# Patient Record
Sex: FEMALE | Race: WHITE | Employment: OTHER | URBAN - METROPOLITAN AREA
[De-identification: names, ages, dates, MRNs, and addresses within clinical notes are randomized per-mention and may not be internally consistent; named-entity substitution may affect disease eponyms.]

---

## 2017-02-11 RX ORDER — AMLODIPINE BESYLATE AND BENAZEPRIL HYDROCHLORIDE 5; 10 MG/1; MG/1
CAPSULE ORAL
Qty: 90 CAPSULE | Refills: 1 | Status: SHIPPED | OUTPATIENT
Start: 2017-02-11 | End: 2017-10-05 | Stop reason: SDUPTHER

## 2017-02-11 RX ORDER — OMEPRAZOLE 20 MG/1
CAPSULE, DELAYED RELEASE ORAL
Qty: 90 CAPSULE | Refills: 1 | Status: SHIPPED | OUTPATIENT
Start: 2017-02-11 | End: 2017-10-05 | Stop reason: SDUPTHER

## 2017-02-11 RX ORDER — CELECOXIB 200 MG/1
CAPSULE ORAL
Qty: 90 CAPSULE | Refills: 1 | Status: SHIPPED | OUTPATIENT
Start: 2017-02-11 | End: 2017-10-05 | Stop reason: SDUPTHER

## 2017-02-27 DIAGNOSIS — R30.0 DYSURIA: ICD-10-CM

## 2017-02-27 RX ORDER — ATORVASTATIN CALCIUM 10 MG/1
TABLET, FILM COATED ORAL
Qty: 90 TABLET | Refills: 0 | Status: SHIPPED | OUTPATIENT
Start: 2017-02-27 | End: 2017-04-27 | Stop reason: SDUPTHER

## 2017-03-01 LAB — URINE CULTURE, ROUTINE: NORMAL

## 2017-04-25 ENCOUNTER — TELEPHONE (OUTPATIENT)
Dept: FAMILY MEDICINE CLINIC | Age: 72
End: 2017-04-25

## 2017-04-27 ENCOUNTER — OFFICE VISIT (OUTPATIENT)
Dept: FAMILY MEDICINE CLINIC | Age: 72
End: 2017-04-27

## 2017-04-27 VITALS
DIASTOLIC BLOOD PRESSURE: 68 MMHG | BODY MASS INDEX: 25.52 KG/M2 | TEMPERATURE: 97.9 F | RESPIRATION RATE: 20 BRPM | HEART RATE: 68 BPM | SYSTOLIC BLOOD PRESSURE: 108 MMHG | HEIGHT: 63 IN | WEIGHT: 144 LBS

## 2017-04-27 DIAGNOSIS — E78.5 HYPERLIPIDEMIA, UNSPECIFIED HYPERLIPIDEMIA TYPE: ICD-10-CM

## 2017-04-27 DIAGNOSIS — I10 ESSENTIAL HYPERTENSION: ICD-10-CM

## 2017-04-27 DIAGNOSIS — E55.9 VITAMIN D DEFICIENCY: ICD-10-CM

## 2017-04-27 DIAGNOSIS — I10 ESSENTIAL HYPERTENSION: Primary | ICD-10-CM

## 2017-04-27 LAB
ALBUMIN SERPL-MCNC: 4.3 G/DL (ref 3.9–4.9)
ALP BLD-CCNC: 66 U/L (ref 40–130)
ALT SERPL-CCNC: 14 U/L (ref 0–33)
ANION GAP SERPL CALCULATED.3IONS-SCNC: 11 MEQ/L (ref 7–13)
AST SERPL-CCNC: 23 U/L (ref 0–35)
BASOPHILS ABSOLUTE: 0.1 K/UL (ref 0–0.2)
BASOPHILS RELATIVE PERCENT: 1.3 %
BILIRUB SERPL-MCNC: 1.3 MG/DL (ref 0–1.2)
BUN BLDV-MCNC: 17 MG/DL (ref 8–23)
CALCIUM SERPL-MCNC: 9.5 MG/DL (ref 8.6–10.2)
CHLORIDE BLD-SCNC: 100 MEQ/L (ref 98–107)
CHOLESTEROL, TOTAL: 164 MG/DL (ref 0–199)
CO2: 30 MEQ/L (ref 22–29)
CREAT SERPL-MCNC: 0.74 MG/DL (ref 0.5–0.9)
EOSINOPHILS ABSOLUTE: 0.2 K/UL (ref 0–0.7)
EOSINOPHILS RELATIVE PERCENT: 4 %
GFR AFRICAN AMERICAN: >60
GFR NON-AFRICAN AMERICAN: >60
GLOBULIN: 1.9 G/DL (ref 2.3–3.5)
GLUCOSE BLD-MCNC: 90 MG/DL (ref 74–109)
HCT VFR BLD CALC: 36.5 % (ref 37–47)
HDLC SERPL-MCNC: 85 MG/DL (ref 40–59)
HEMOGLOBIN: 12.3 G/DL (ref 12–16)
LDL CHOLESTEROL CALCULATED: 64 MG/DL (ref 0–129)
LYMPHOCYTES ABSOLUTE: 1.5 K/UL (ref 1–4.8)
LYMPHOCYTES RELATIVE PERCENT: 34.2 %
MCH RBC QN AUTO: 29.3 PG (ref 27–31.3)
MCHC RBC AUTO-ENTMCNC: 33.7 % (ref 33–37)
MCV RBC AUTO: 86.9 FL (ref 82–100)
MONOCYTES ABSOLUTE: 0.4 K/UL (ref 0.2–0.8)
MONOCYTES RELATIVE PERCENT: 10.2 %
NEUTROPHILS ABSOLUTE: 2.2 K/UL (ref 1.4–6.5)
NEUTROPHILS RELATIVE PERCENT: 50.3 %
PDW BLD-RTO: 13.5 % (ref 11.5–14.5)
PLATELET # BLD: 168 K/UL (ref 130–400)
POTASSIUM SERPL-SCNC: 3.3 MEQ/L (ref 3.5–5.1)
RBC # BLD: 4.19 M/UL (ref 4.2–5.4)
SODIUM BLD-SCNC: 141 MEQ/L (ref 132–144)
TOTAL PROTEIN: 6.2 G/DL (ref 6.4–8.1)
TRIGL SERPL-MCNC: 77 MG/DL (ref 0–200)
VITAMIN D 25-HYDROXY: 57.3 NG/ML (ref 30–100)
WBC # BLD: 4.3 K/UL (ref 4.8–10.8)

## 2017-04-27 PROCEDURE — 1090F PRES/ABSN URINE INCON ASSESS: CPT | Performed by: FAMILY MEDICINE

## 2017-04-27 PROCEDURE — G8427 DOCREV CUR MEDS BY ELIG CLIN: HCPCS | Performed by: FAMILY MEDICINE

## 2017-04-27 PROCEDURE — G8420 CALC BMI NORM PARAMETERS: HCPCS | Performed by: FAMILY MEDICINE

## 2017-04-27 PROCEDURE — G8399 PT W/DXA RESULTS DOCUMENT: HCPCS | Performed by: FAMILY MEDICINE

## 2017-04-27 PROCEDURE — 1036F TOBACCO NON-USER: CPT | Performed by: FAMILY MEDICINE

## 2017-04-27 PROCEDURE — 99213 OFFICE O/P EST LOW 20 MIN: CPT | Performed by: FAMILY MEDICINE

## 2017-04-27 PROCEDURE — 4040F PNEUMOC VAC/ADMIN/RCVD: CPT | Performed by: FAMILY MEDICINE

## 2017-04-27 PROCEDURE — 1123F ACP DISCUSS/DSCN MKR DOCD: CPT | Performed by: FAMILY MEDICINE

## 2017-04-27 PROCEDURE — 3014F SCREEN MAMMO DOC REV: CPT | Performed by: FAMILY MEDICINE

## 2017-04-27 PROCEDURE — 3017F COLORECTAL CA SCREEN DOC REV: CPT | Performed by: FAMILY MEDICINE

## 2017-04-27 RX ORDER — ATORVASTATIN CALCIUM 10 MG/1
TABLET, FILM COATED ORAL
Qty: 90 TABLET | Refills: 1 | Status: SHIPPED | OUTPATIENT
Start: 2017-04-27 | End: 2018-03-03 | Stop reason: SDUPTHER

## 2017-04-27 RX ORDER — GABAPENTIN 300 MG/1
CAPSULE ORAL
Qty: 90 CAPSULE | Refills: 1 | Status: SHIPPED | OUTPATIENT
Start: 2017-04-27 | End: 2017-11-20 | Stop reason: SDUPTHER

## 2017-04-27 RX ORDER — INDAPAMIDE 1.25 MG/1
TABLET, FILM COATED ORAL
Qty: 90 TABLET | Refills: 1 | Status: SHIPPED | OUTPATIENT
Start: 2017-04-27 | End: 2017-05-25

## 2017-04-27 ASSESSMENT — ENCOUNTER SYMPTOMS
EYE ITCHING: 0
ABDOMINAL PAIN: 0
CONSTIPATION: 0
DIARRHEA: 0
SINUS PRESSURE: 0
COUGH: 0
SHORTNESS OF BREATH: 0
EYE DISCHARGE: 0
SORE THROAT: 0

## 2017-04-27 ASSESSMENT — PATIENT HEALTH QUESTIONNAIRE - PHQ9
SUM OF ALL RESPONSES TO PHQ9 QUESTIONS 1 & 2: 0
SUM OF ALL RESPONSES TO PHQ QUESTIONS 1-9: 0
2. FEELING DOWN, DEPRESSED OR HOPELESS: 0
1. LITTLE INTEREST OR PLEASURE IN DOING THINGS: 0

## 2017-05-25 ENCOUNTER — OFFICE VISIT (OUTPATIENT)
Dept: FAMILY MEDICINE CLINIC | Age: 72
End: 2017-05-25

## 2017-05-25 VITALS
DIASTOLIC BLOOD PRESSURE: 68 MMHG | TEMPERATURE: 97.5 F | RESPIRATION RATE: 20 BRPM | HEART RATE: 55 BPM | BODY MASS INDEX: 27.38 KG/M2 | WEIGHT: 145 LBS | HEIGHT: 61 IN | SYSTOLIC BLOOD PRESSURE: 114 MMHG

## 2017-05-25 DIAGNOSIS — N28.89 RENAL MASS, LEFT: ICD-10-CM

## 2017-05-25 DIAGNOSIS — R42 DIZZINESS: Primary | ICD-10-CM

## 2017-05-25 DIAGNOSIS — Z09 HOSPITAL DISCHARGE FOLLOW-UP: ICD-10-CM

## 2017-05-25 DIAGNOSIS — R55 NEAR SYNCOPE: ICD-10-CM

## 2017-05-25 PROCEDURE — 1123F ACP DISCUSS/DSCN MKR DOCD: CPT | Performed by: FAMILY MEDICINE

## 2017-05-25 PROCEDURE — 3014F SCREEN MAMMO DOC REV: CPT | Performed by: FAMILY MEDICINE

## 2017-05-25 PROCEDURE — 1090F PRES/ABSN URINE INCON ASSESS: CPT | Performed by: FAMILY MEDICINE

## 2017-05-25 PROCEDURE — 3017F COLORECTAL CA SCREEN DOC REV: CPT | Performed by: FAMILY MEDICINE

## 2017-05-25 PROCEDURE — 1036F TOBACCO NON-USER: CPT | Performed by: FAMILY MEDICINE

## 2017-05-25 PROCEDURE — 99213 OFFICE O/P EST LOW 20 MIN: CPT | Performed by: FAMILY MEDICINE

## 2017-05-25 PROCEDURE — G8420 CALC BMI NORM PARAMETERS: HCPCS | Performed by: FAMILY MEDICINE

## 2017-05-25 PROCEDURE — G8427 DOCREV CUR MEDS BY ELIG CLIN: HCPCS | Performed by: FAMILY MEDICINE

## 2017-05-25 PROCEDURE — 4040F PNEUMOC VAC/ADMIN/RCVD: CPT | Performed by: FAMILY MEDICINE

## 2017-05-25 PROCEDURE — G8399 PT W/DXA RESULTS DOCUMENT: HCPCS | Performed by: FAMILY MEDICINE

## 2017-05-25 ASSESSMENT — ENCOUNTER SYMPTOMS
EYE DISCHARGE: 0
COUGH: 0
ABDOMINAL PAIN: 0
DIARRHEA: 0
SORE THROAT: 0
SHORTNESS OF BREATH: 0
EYE ITCHING: 0
CONSTIPATION: 0
SINUS PRESSURE: 0

## 2017-06-05 ENCOUNTER — HOSPITAL ENCOUNTER (OUTPATIENT)
Dept: MRI IMAGING | Age: 72
Discharge: HOME | End: 2017-06-05
Payer: MEDICARE

## 2017-06-05 DIAGNOSIS — N28.89 RENAL MASS, LEFT: ICD-10-CM

## 2017-06-05 PROCEDURE — 6360000004 HC RX CONTRAST MEDICATION: Performed by: RADIOLOGY

## 2017-06-05 PROCEDURE — A9579 GAD-BASE MR CONTRAST NOS,1ML: HCPCS | Performed by: RADIOLOGY

## 2017-06-05 PROCEDURE — 74183 MRI ABD W/O CNTR FLWD CNTR: CPT

## 2017-06-05 RX ADMIN — GADOVERSETAMIDE 30 ML: 0.5 INJECTION, SOLUTION INTRAVENOUS at 09:49

## 2017-06-06 ENCOUNTER — OFFICE VISIT (OUTPATIENT)
Dept: FAMILY MEDICINE CLINIC | Age: 72
End: 2017-06-06

## 2017-06-06 VITALS
HEART RATE: 60 BPM | WEIGHT: 150 LBS | TEMPERATURE: 98.6 F | BODY MASS INDEX: 28.32 KG/M2 | SYSTOLIC BLOOD PRESSURE: 130 MMHG | DIASTOLIC BLOOD PRESSURE: 76 MMHG | HEIGHT: 61 IN | RESPIRATION RATE: 12 BRPM

## 2017-06-06 DIAGNOSIS — N28.89 RENAL MASS, LEFT: ICD-10-CM

## 2017-06-06 DIAGNOSIS — R10.9 ABDOMINAL PAIN, UNSPECIFIED LOCATION: Primary | ICD-10-CM

## 2017-06-06 PROCEDURE — 99214 OFFICE O/P EST MOD 30 MIN: CPT | Performed by: FAMILY MEDICINE

## 2017-06-06 PROCEDURE — G8427 DOCREV CUR MEDS BY ELIG CLIN: HCPCS | Performed by: FAMILY MEDICINE

## 2017-06-06 PROCEDURE — 1090F PRES/ABSN URINE INCON ASSESS: CPT | Performed by: FAMILY MEDICINE

## 2017-06-06 PROCEDURE — 3014F SCREEN MAMMO DOC REV: CPT | Performed by: FAMILY MEDICINE

## 2017-06-06 PROCEDURE — 4040F PNEUMOC VAC/ADMIN/RCVD: CPT | Performed by: FAMILY MEDICINE

## 2017-06-06 PROCEDURE — 1036F TOBACCO NON-USER: CPT | Performed by: FAMILY MEDICINE

## 2017-06-06 PROCEDURE — 1123F ACP DISCUSS/DSCN MKR DOCD: CPT | Performed by: FAMILY MEDICINE

## 2017-06-06 PROCEDURE — G8399 PT W/DXA RESULTS DOCUMENT: HCPCS | Performed by: FAMILY MEDICINE

## 2017-06-06 PROCEDURE — G8420 CALC BMI NORM PARAMETERS: HCPCS | Performed by: FAMILY MEDICINE

## 2017-06-06 PROCEDURE — 3017F COLORECTAL CA SCREEN DOC REV: CPT | Performed by: FAMILY MEDICINE

## 2017-06-06 ASSESSMENT — ENCOUNTER SYMPTOMS
CONSTIPATION: 0
SHORTNESS OF BREATH: 0
NAUSEA: 0
COUGH: 0
EYES NEGATIVE: 1
ABDOMINAL PAIN: 0
DIARRHEA: 0

## 2017-06-12 ENCOUNTER — OFFICE VISIT (OUTPATIENT)
Dept: UROLOGY | Age: 72
End: 2017-06-12

## 2017-06-12 VITALS
HEART RATE: 56 BPM | SYSTOLIC BLOOD PRESSURE: 140 MMHG | BODY MASS INDEX: 27.38 KG/M2 | DIASTOLIC BLOOD PRESSURE: 86 MMHG | WEIGHT: 145 LBS | HEIGHT: 61 IN

## 2017-06-12 DIAGNOSIS — N28.89 LEFT RENAL MASS: Primary | ICD-10-CM

## 2017-06-12 PROCEDURE — 4040F PNEUMOC VAC/ADMIN/RCVD: CPT | Performed by: UROLOGY

## 2017-06-12 PROCEDURE — 99202 OFFICE O/P NEW SF 15 MIN: CPT | Performed by: UROLOGY

## 2017-06-12 PROCEDURE — G8419 CALC BMI OUT NRM PARAM NOF/U: HCPCS | Performed by: UROLOGY

## 2017-06-12 PROCEDURE — 1036F TOBACCO NON-USER: CPT | Performed by: UROLOGY

## 2017-06-12 PROCEDURE — G8399 PT W/DXA RESULTS DOCUMENT: HCPCS | Performed by: UROLOGY

## 2017-06-12 PROCEDURE — G8427 DOCREV CUR MEDS BY ELIG CLIN: HCPCS | Performed by: UROLOGY

## 2017-06-12 PROCEDURE — 1090F PRES/ABSN URINE INCON ASSESS: CPT | Performed by: UROLOGY

## 2017-06-12 PROCEDURE — 3017F COLORECTAL CA SCREEN DOC REV: CPT | Performed by: UROLOGY

## 2017-06-12 PROCEDURE — 3014F SCREEN MAMMO DOC REV: CPT | Performed by: UROLOGY

## 2017-06-12 PROCEDURE — 1123F ACP DISCUSS/DSCN MKR DOCD: CPT | Performed by: UROLOGY

## 2017-06-13 ENCOUNTER — OFFICE VISIT (OUTPATIENT)
Dept: INTERVENTIONAL RADIOLOGY/VASCULAR | Age: 72
End: 2017-06-13

## 2017-06-13 VITALS
HEART RATE: 59 BPM | OXYGEN SATURATION: 94 % | RESPIRATION RATE: 16 BRPM | SYSTOLIC BLOOD PRESSURE: 132 MMHG | DIASTOLIC BLOOD PRESSURE: 66 MMHG

## 2017-06-13 DIAGNOSIS — N28.89 LEFT RENAL MASS: Primary | ICD-10-CM

## 2017-06-13 PROCEDURE — 3014F SCREEN MAMMO DOC REV: CPT | Performed by: RADIOLOGY

## 2017-06-13 PROCEDURE — 99204 OFFICE O/P NEW MOD 45 MIN: CPT | Performed by: RADIOLOGY

## 2017-06-13 PROCEDURE — G8419 CALC BMI OUT NRM PARAM NOF/U: HCPCS | Performed by: RADIOLOGY

## 2017-06-13 PROCEDURE — 1123F ACP DISCUSS/DSCN MKR DOCD: CPT | Performed by: RADIOLOGY

## 2017-06-13 PROCEDURE — G8427 DOCREV CUR MEDS BY ELIG CLIN: HCPCS | Performed by: RADIOLOGY

## 2017-06-13 PROCEDURE — 3017F COLORECTAL CA SCREEN DOC REV: CPT | Performed by: RADIOLOGY

## 2017-06-13 PROCEDURE — G8399 PT W/DXA RESULTS DOCUMENT: HCPCS | Performed by: RADIOLOGY

## 2017-06-13 PROCEDURE — 1090F PRES/ABSN URINE INCON ASSESS: CPT | Performed by: RADIOLOGY

## 2017-06-13 PROCEDURE — 1036F TOBACCO NON-USER: CPT | Performed by: RADIOLOGY

## 2017-06-13 PROCEDURE — 4040F PNEUMOC VAC/ADMIN/RCVD: CPT | Performed by: RADIOLOGY

## 2017-06-13 ASSESSMENT — ENCOUNTER SYMPTOMS
DIARRHEA: 0
VOMITING: 0
HEARTBURN: 0
NAUSEA: 0
BLURRED VISION: 0
WHEEZING: 0
SPUTUM PRODUCTION: 0
COUGH: 0
SHORTNESS OF BREATH: 0
DOUBLE VISION: 0

## 2017-08-24 ENCOUNTER — OFFICE VISIT (OUTPATIENT)
Dept: FAMILY MEDICINE CLINIC | Age: 72
End: 2017-08-24

## 2017-08-24 VITALS
HEIGHT: 61 IN | RESPIRATION RATE: 16 BRPM | BODY MASS INDEX: 28.32 KG/M2 | DIASTOLIC BLOOD PRESSURE: 68 MMHG | WEIGHT: 150 LBS | SYSTOLIC BLOOD PRESSURE: 108 MMHG | TEMPERATURE: 98.2 F | HEART RATE: 60 BPM

## 2017-08-24 DIAGNOSIS — E04.1 THYROID NODULE: ICD-10-CM

## 2017-08-24 DIAGNOSIS — Z90.5 S/P NEPHRECTOMY: Primary | ICD-10-CM

## 2017-08-24 DIAGNOSIS — E04.9 ENLARGED THYROID GLAND: ICD-10-CM

## 2017-08-24 DIAGNOSIS — K80.20 GALLSTONES: ICD-10-CM

## 2017-08-24 PROCEDURE — 1036F TOBACCO NON-USER: CPT | Performed by: FAMILY MEDICINE

## 2017-08-24 PROCEDURE — 1123F ACP DISCUSS/DSCN MKR DOCD: CPT | Performed by: FAMILY MEDICINE

## 2017-08-24 PROCEDURE — 4040F PNEUMOC VAC/ADMIN/RCVD: CPT | Performed by: FAMILY MEDICINE

## 2017-08-24 PROCEDURE — 1090F PRES/ABSN URINE INCON ASSESS: CPT | Performed by: FAMILY MEDICINE

## 2017-08-24 PROCEDURE — G8427 DOCREV CUR MEDS BY ELIG CLIN: HCPCS | Performed by: FAMILY MEDICINE

## 2017-08-24 PROCEDURE — G8419 CALC BMI OUT NRM PARAM NOF/U: HCPCS | Performed by: FAMILY MEDICINE

## 2017-08-24 PROCEDURE — 3014F SCREEN MAMMO DOC REV: CPT | Performed by: FAMILY MEDICINE

## 2017-08-24 PROCEDURE — G8399 PT W/DXA RESULTS DOCUMENT: HCPCS | Performed by: FAMILY MEDICINE

## 2017-08-24 PROCEDURE — 3017F COLORECTAL CA SCREEN DOC REV: CPT | Performed by: FAMILY MEDICINE

## 2017-08-24 PROCEDURE — 99214 OFFICE O/P EST MOD 30 MIN: CPT | Performed by: FAMILY MEDICINE

## 2017-08-24 ASSESSMENT — ENCOUNTER SYMPTOMS
DIARRHEA: 0
COUGH: 0
EYE DISCHARGE: 0
ABDOMINAL PAIN: 0
SORE THROAT: 0
SHORTNESS OF BREATH: 0
EYE ITCHING: 0
SINUS PRESSURE: 0
CONSTIPATION: 0

## 2017-08-26 RX ORDER — IRON POLYSACCHARIDE COMPLEX 150 MG
CAPSULE ORAL
Qty: 180 CAPSULE | Refills: 0 | Status: SHIPPED | OUTPATIENT
Start: 2017-08-26 | End: 2017-12-22 | Stop reason: SDUPTHER

## 2017-08-28 ENCOUNTER — HOSPITAL ENCOUNTER (OUTPATIENT)
Dept: ULTRASOUND IMAGING | Age: 72
Discharge: HOME | End: 2017-08-28
Payer: MEDICARE

## 2017-08-28 DIAGNOSIS — E04.1 THYROID NODULE: ICD-10-CM

## 2017-08-28 PROCEDURE — 76536 US EXAM OF HEAD AND NECK: CPT

## 2017-09-11 ENCOUNTER — OFFICE VISIT (OUTPATIENT)
Dept: UROLOGY | Age: 72
End: 2017-09-11

## 2017-09-11 VITALS
BODY MASS INDEX: 29.64 KG/M2 | HEIGHT: 59 IN | DIASTOLIC BLOOD PRESSURE: 86 MMHG | HEART RATE: 57 BPM | WEIGHT: 147 LBS | SYSTOLIC BLOOD PRESSURE: 138 MMHG

## 2017-09-11 DIAGNOSIS — D64.9 ANEMIA, UNSPECIFIED TYPE: ICD-10-CM

## 2017-09-11 DIAGNOSIS — N28.9 RENAL INSUFFICIENCY: Primary | ICD-10-CM

## 2017-09-11 DIAGNOSIS — N28.9 RENAL INSUFFICIENCY: ICD-10-CM

## 2017-09-11 LAB
ANION GAP SERPL CALCULATED.3IONS-SCNC: 15 MEQ/L (ref 7–13)
BUN BLDV-MCNC: 28 MG/DL (ref 8–23)
CALCIUM SERPL-MCNC: 9.7 MG/DL (ref 8.6–10.2)
CHLORIDE BLD-SCNC: 98 MEQ/L (ref 98–107)
CO2: 27 MEQ/L (ref 22–29)
CREAT SERPL-MCNC: 1.15 MG/DL (ref 0.5–0.9)
GFR AFRICAN AMERICAN: 56.4
GFR NON-AFRICAN AMERICAN: 46.6
GLUCOSE BLD-MCNC: 89 MG/DL (ref 74–109)
HCT VFR BLD CALC: 35.7 % (ref 37–47)
HEMOGLOBIN: 11.6 G/DL (ref 12–16)
MCH RBC QN AUTO: 28.1 PG (ref 27–31.3)
MCHC RBC AUTO-ENTMCNC: 32.6 % (ref 33–37)
MCV RBC AUTO: 86.1 FL (ref 82–100)
PDW BLD-RTO: 13.4 % (ref 11.5–14.5)
PLATELET # BLD: 219 K/UL (ref 130–400)
POTASSIUM SERPL-SCNC: 4.3 MEQ/L (ref 3.5–5.1)
RBC # BLD: 4.14 M/UL (ref 4.2–5.4)
SODIUM BLD-SCNC: 140 MEQ/L (ref 132–144)
WBC # BLD: 5.2 K/UL (ref 4.8–10.8)

## 2017-09-11 PROCEDURE — 4040F PNEUMOC VAC/ADMIN/RCVD: CPT | Performed by: UROLOGY

## 2017-09-11 PROCEDURE — 1123F ACP DISCUSS/DSCN MKR DOCD: CPT | Performed by: UROLOGY

## 2017-09-11 PROCEDURE — 1036F TOBACCO NON-USER: CPT | Performed by: UROLOGY

## 2017-09-11 PROCEDURE — 1090F PRES/ABSN URINE INCON ASSESS: CPT | Performed by: UROLOGY

## 2017-09-11 PROCEDURE — 3014F SCREEN MAMMO DOC REV: CPT | Performed by: UROLOGY

## 2017-09-11 PROCEDURE — G8417 CALC BMI ABV UP PARAM F/U: HCPCS | Performed by: UROLOGY

## 2017-09-11 PROCEDURE — 99214 OFFICE O/P EST MOD 30 MIN: CPT | Performed by: UROLOGY

## 2017-09-11 PROCEDURE — G8399 PT W/DXA RESULTS DOCUMENT: HCPCS | Performed by: UROLOGY

## 2017-09-11 PROCEDURE — G8427 DOCREV CUR MEDS BY ELIG CLIN: HCPCS | Performed by: UROLOGY

## 2017-09-11 PROCEDURE — 3017F COLORECTAL CA SCREEN DOC REV: CPT | Performed by: UROLOGY

## 2017-09-20 ENCOUNTER — OFFICE VISIT (OUTPATIENT)
Dept: FAMILY MEDICINE CLINIC | Age: 72
End: 2017-09-20

## 2017-09-20 VITALS
SYSTOLIC BLOOD PRESSURE: 126 MMHG | OXYGEN SATURATION: 98 % | WEIGHT: 153 LBS | BODY MASS INDEX: 28.89 KG/M2 | DIASTOLIC BLOOD PRESSURE: 74 MMHG | HEART RATE: 66 BPM | HEIGHT: 61 IN | RESPIRATION RATE: 20 BRPM

## 2017-09-20 DIAGNOSIS — Z23 NEED FOR INFLUENZA VACCINATION: Primary | ICD-10-CM

## 2017-09-20 DIAGNOSIS — M79.605 PAIN OF LEFT LOWER EXTREMITY: ICD-10-CM

## 2017-09-20 PROCEDURE — 99213 OFFICE O/P EST LOW 20 MIN: CPT | Performed by: FAMILY MEDICINE

## 2017-09-20 PROCEDURE — G8427 DOCREV CUR MEDS BY ELIG CLIN: HCPCS | Performed by: FAMILY MEDICINE

## 2017-09-20 PROCEDURE — G8417 CALC BMI ABV UP PARAM F/U: HCPCS | Performed by: FAMILY MEDICINE

## 2017-09-20 PROCEDURE — 1036F TOBACCO NON-USER: CPT | Performed by: FAMILY MEDICINE

## 2017-09-20 PROCEDURE — 4040F PNEUMOC VAC/ADMIN/RCVD: CPT | Performed by: FAMILY MEDICINE

## 2017-09-20 PROCEDURE — 1123F ACP DISCUSS/DSCN MKR DOCD: CPT | Performed by: FAMILY MEDICINE

## 2017-09-20 PROCEDURE — 1090F PRES/ABSN URINE INCON ASSESS: CPT | Performed by: FAMILY MEDICINE

## 2017-09-20 PROCEDURE — 3017F COLORECTAL CA SCREEN DOC REV: CPT | Performed by: FAMILY MEDICINE

## 2017-09-20 PROCEDURE — 3014F SCREEN MAMMO DOC REV: CPT | Performed by: FAMILY MEDICINE

## 2017-09-20 PROCEDURE — G8399 PT W/DXA RESULTS DOCUMENT: HCPCS | Performed by: FAMILY MEDICINE

## 2017-09-20 PROCEDURE — 90662 IIV NO PRSV INCREASED AG IM: CPT | Performed by: FAMILY MEDICINE

## 2017-09-20 PROCEDURE — G0008 ADMIN INFLUENZA VIRUS VAC: HCPCS | Performed by: FAMILY MEDICINE

## 2017-09-20 RX ORDER — PREDNISONE 10 MG/1
TABLET ORAL
Qty: 51 TABLET | Refills: 0 | Status: SHIPPED | OUTPATIENT
Start: 2017-09-20 | End: 2017-09-30

## 2017-09-20 ASSESSMENT — ENCOUNTER SYMPTOMS
EYE ITCHING: 0
SORE THROAT: 0
ABDOMINAL PAIN: 0
DIARRHEA: 0
EYE DISCHARGE: 0
SHORTNESS OF BREATH: 0
CONSTIPATION: 0
SINUS PRESSURE: 0
COUGH: 0

## 2017-09-22 ENCOUNTER — HOSPITAL ENCOUNTER (OUTPATIENT)
Dept: ULTRASOUND IMAGING | Age: 72
Discharge: HOME | End: 2017-09-22
Payer: MEDICARE

## 2017-09-22 DIAGNOSIS — M79.605 PAIN OF LEFT LOWER EXTREMITY: ICD-10-CM

## 2017-09-22 PROCEDURE — 93971 EXTREMITY STUDY: CPT

## 2017-10-04 ENCOUNTER — OFFICE VISIT (OUTPATIENT)
Dept: FAMILY MEDICINE CLINIC | Age: 72
End: 2017-10-04

## 2017-10-04 VITALS
WEIGHT: 153 LBS | HEIGHT: 61 IN | BODY MASS INDEX: 28.89 KG/M2 | DIASTOLIC BLOOD PRESSURE: 78 MMHG | RESPIRATION RATE: 20 BRPM | HEART RATE: 65 BPM | TEMPERATURE: 97.7 F | SYSTOLIC BLOOD PRESSURE: 118 MMHG

## 2017-10-04 DIAGNOSIS — M79.605 LEFT LEG PAIN: ICD-10-CM

## 2017-10-04 DIAGNOSIS — M54.42 LEFT-SIDED LOW BACK PAIN WITH LEFT-SIDED SCIATICA, UNSPECIFIED CHRONICITY: Primary | ICD-10-CM

## 2017-10-04 DIAGNOSIS — M81.0 AGE-RELATED OSTEOPOROSIS WITHOUT CURRENT PATHOLOGICAL FRACTURE: ICD-10-CM

## 2017-10-04 PROCEDURE — 3017F COLORECTAL CA SCREEN DOC REV: CPT | Performed by: FAMILY MEDICINE

## 2017-10-04 PROCEDURE — G8427 DOCREV CUR MEDS BY ELIG CLIN: HCPCS | Performed by: FAMILY MEDICINE

## 2017-10-04 PROCEDURE — G8417 CALC BMI ABV UP PARAM F/U: HCPCS | Performed by: FAMILY MEDICINE

## 2017-10-04 PROCEDURE — G8484 FLU IMMUNIZE NO ADMIN: HCPCS | Performed by: FAMILY MEDICINE

## 2017-10-04 PROCEDURE — 1036F TOBACCO NON-USER: CPT | Performed by: FAMILY MEDICINE

## 2017-10-04 PROCEDURE — 4040F PNEUMOC VAC/ADMIN/RCVD: CPT | Performed by: FAMILY MEDICINE

## 2017-10-04 PROCEDURE — 3014F SCREEN MAMMO DOC REV: CPT | Performed by: FAMILY MEDICINE

## 2017-10-04 PROCEDURE — G8399 PT W/DXA RESULTS DOCUMENT: HCPCS | Performed by: FAMILY MEDICINE

## 2017-10-04 PROCEDURE — 1090F PRES/ABSN URINE INCON ASSESS: CPT | Performed by: FAMILY MEDICINE

## 2017-10-04 PROCEDURE — 4005F PHARM THX FOR OP RXD: CPT | Performed by: FAMILY MEDICINE

## 2017-10-04 PROCEDURE — 1123F ACP DISCUSS/DSCN MKR DOCD: CPT | Performed by: FAMILY MEDICINE

## 2017-10-04 PROCEDURE — 99213 OFFICE O/P EST LOW 20 MIN: CPT | Performed by: FAMILY MEDICINE

## 2017-10-04 ASSESSMENT — ENCOUNTER SYMPTOMS
CONSTIPATION: 0
SORE THROAT: 0
BACK PAIN: 1
SINUS PRESSURE: 0
EYE ITCHING: 0
DIARRHEA: 0
SHORTNESS OF BREATH: 0
ABDOMINAL PAIN: 0
COUGH: 0
EYE DISCHARGE: 0

## 2017-10-04 NOTE — PROGRESS NOTES
Subjective  Sherron Handler, 81 y.o. female presents today with:  Chief Complaint   Patient presents with    Leg Pain     f/u last ov left leg inner thigh pain that radiated to lower leg. She was rx'ed Prednsione- minimal relief. She now reports that she is having lower back pain, too    Results     venous duplex done 09/22/17           HPI    Patient in for follow-up on test negative for DVT  The back pain. No other questions and or concerns for today's visit      Review of Systems   Constitutional: Negative for appetite change, fatigue and fever. HENT: Negative for congestion, ear pain, sinus pressure and sore throat. Eyes: Negative for discharge and itching. Respiratory: Negative for cough and shortness of breath. Cardiovascular: Negative for chest pain and palpitations. Gastrointestinal: Negative for abdominal pain, constipation and diarrhea. Endocrine: Negative for polydipsia. Genitourinary: Negative for difficulty urinating. Musculoskeletal: Positive for back pain. Negative for gait problem. Skin: Negative. Neurological: Negative for dizziness. Hematological: Negative. Psychiatric/Behavioral: Negative. Past Medical History:   Diagnosis Date    Anemia     Arthrosis of ankle     SEVERE BILATERALLY     Diverticulosis     Gastric polyps     HTN (hypertension) 3/1/2012    Hyperlipidemia 3/1/2012    OA (osteoarthritis)     Osteopenia     Vitamin D deficiency      Past Surgical History:   Procedure Laterality Date    ANKLE SURGERY  2010    LEFT    CARDIAC CATHETERIZATION  2008    NORMAL    CYSTOSCOPY  07/2017    Dr. Tyree Reyes Left 07/2017    left robotic, Dr. Priya Cardenas History     Social History    Marital status:      Spouse name: N/A    Number of children: N/A    Years of education: N/A     Occupational History    Not on file.      Social History Main Topics    Smoking

## 2017-10-05 ENCOUNTER — HOSPITAL ENCOUNTER (OUTPATIENT)
Dept: PHYSICAL THERAPY | Age: 72
Setting detail: THERAPIES SERIES
Discharge: HOME | End: 2017-10-05
Payer: MEDICARE

## 2017-10-05 PROCEDURE — 97110 THERAPEUTIC EXERCISES: CPT

## 2017-10-05 PROCEDURE — G8978 MOBILITY CURRENT STATUS: HCPCS

## 2017-10-05 PROCEDURE — 97162 PT EVAL MOD COMPLEX 30 MIN: CPT

## 2017-10-05 PROCEDURE — G8979 MOBILITY GOAL STATUS: HCPCS

## 2017-10-05 ASSESSMENT — PAIN DESCRIPTION - DESCRIPTORS: DESCRIPTORS: SORE;BURNING

## 2017-10-05 ASSESSMENT — PAIN DESCRIPTION - FREQUENCY: FREQUENCY: INTERMITTENT

## 2017-10-05 ASSESSMENT — PAIN DESCRIPTION - PAIN TYPE: TYPE: ACUTE PAIN

## 2017-10-05 ASSESSMENT — PAIN DESCRIPTION - ORIENTATION: ORIENTATION: LEFT;UPPER;INNER

## 2017-10-05 ASSESSMENT — PAIN DESCRIPTION - LOCATION: LOCATION: LEG

## 2017-10-05 ASSESSMENT — PAIN SCALES - GENERAL: PAINLEVEL_OUTOF10: 5

## 2017-10-05 NOTE — PLAN OF CARE
[address]   [address] [place], [place]  Phone:  [phone]   Fax:  [phone]    [x] Certification  [] Recertification [x]  Plan of Care  [] Progress Note   [] Discharge        To:  Referring Practitioner: Dr. Ilya Sharma   From:  Chacho Waddell, PT  Patient: Jenifer Harper         : 1945  Diagnosis: Lt sided LBP with Lt sided sciatica, Lt leg pain       Date: 10/5/2017  Treatment Diagnosis: difficulty with gait, imbalance, LE weakness, decreased LE and lumbar ROM     Plan of Care/Certification Expiration Date: 17  Progress Report Period from:   to 10/5/2017    Total # of Visits to Date: 1   No Show: 0    Canceled Appointment: 0     OBJECTIVE:   Short Term Goals - Time Frame for Short term goals: 2 wks     Goals Current/Discharge status  Met   Short term goal 1: Independent with HEP   Need for HEP  [] yes  [] no     Long Term Goals - Time Frame for Long term goals : 6 wks   Goals Current/ Discharge status Met   Long term goal 1: Improve LE strength >/= 4+/5 to improve balance and gait quality  Strength RLE  Strength RLE: Exception  R Hip Flexion: 4+/5  R Hip Extension: 3-/5  R Hip ABduction: 4-/5  R Knee Flexion: 4+/5  R Knee Extension: 4/5  R Ankle Dorsiflexion: 4+/5  Strength LLE  Strength LLE: Exception  L Hip Flexion: 4-/5  L Hip Extension: 3+/5  L Hip ABduction: 4-/5  L Knee Flexion: 4+/5  L Knee Extension: 4/5  L Ankle Dorsiflexion: 4+/5 [] yes  [] no   Long term goal 2: Improve balance >/= 52/56 to demonstrate decreased risk for falls  Leigh Balance Score: 47   [] yes  [] no   Long term goal 3: Ambulate with decreased Trendelenburg bilaterally x150' without AD independently  ambulates with bilateral Trendelenburg, good susan, mild pathway deviations, no LOB, good step length [] yes  [] no   Long term goal 4: LEFS >/= 60/80 to improve overall activity tolerance  LEFS: 41/80 [] yes  [] no     Body structures, Functions, Activity

## 2017-10-06 RX ORDER — AMLODIPINE BESYLATE AND BENAZEPRIL HYDROCHLORIDE 5; 10 MG/1; MG/1
CAPSULE ORAL
Qty: 90 CAPSULE | Refills: 1 | Status: SHIPPED | OUTPATIENT
Start: 2017-10-06 | End: 2018-03-21 | Stop reason: SDUPTHER

## 2017-10-06 RX ORDER — CELECOXIB 200 MG/1
CAPSULE ORAL
Qty: 90 CAPSULE | Refills: 1 | Status: SHIPPED | OUTPATIENT
Start: 2017-10-06 | End: 2018-03-21 | Stop reason: SDUPTHER

## 2017-10-06 RX ORDER — OMEPRAZOLE 20 MG/1
CAPSULE, DELAYED RELEASE ORAL
Qty: 90 CAPSULE | Refills: 1 | Status: SHIPPED | OUTPATIENT
Start: 2017-10-06 | End: 2018-03-21 | Stop reason: SDUPTHER

## 2017-10-09 ENCOUNTER — HOSPITAL ENCOUNTER (OUTPATIENT)
Dept: WOMENS IMAGING | Age: 72
Discharge: HOME | End: 2017-10-09
Payer: MEDICARE

## 2017-10-09 DIAGNOSIS — M81.0 AGE-RELATED OSTEOPOROSIS WITHOUT CURRENT PATHOLOGICAL FRACTURE: ICD-10-CM

## 2017-10-09 PROCEDURE — 77080 DXA BONE DENSITY AXIAL: CPT

## 2017-10-10 ENCOUNTER — HOSPITAL ENCOUNTER (OUTPATIENT)
Dept: PHYSICAL THERAPY | Age: 72
Setting detail: THERAPIES SERIES
Discharge: HOME | End: 2017-10-10
Payer: MEDICARE

## 2017-10-10 PROCEDURE — 97110 THERAPEUTIC EXERCISES: CPT

## 2017-10-10 PROCEDURE — 97140 MANUAL THERAPY 1/> REGIONS: CPT

## 2017-10-10 ASSESSMENT — PAIN DESCRIPTION - LOCATION: LOCATION: BACK

## 2017-10-10 ASSESSMENT — PAIN SCALES - GENERAL: PAINLEVEL_OUTOF10: 5

## 2017-10-10 ASSESSMENT — PAIN DESCRIPTION - ORIENTATION: ORIENTATION: LOWER

## 2017-10-10 ASSESSMENT — PAIN DESCRIPTION - PAIN TYPE: TYPE: ACUTE PAIN

## 2017-10-10 NOTE — PROGRESS NOTES
[facility]   Outpatient Physical Therapy   Treatment Note  [] [address]  [x] [facility]            of [address]  Date: 10/10/2017  Patient: Sonam Ritter  : 1945  ACCT #: [de-identified]  Referring Practitioner: Dr. Julian Maguire   Diagnosis: Lt sided LBP with Lt sided sciatica, Lt leg pain     Visit Information:  PT Visit Information  Onset Date:  (5 mos )  PT Insurance Information: Medicare, [facility]   Total # of Visits Approved:  (medical necessity )  Total # of Visits to Date: 2  Plan of Care/Certification Expiration Date: 17  No Show: 0  Canceled Appointment: 0  Progress Note Counter: 2/10    SUBJECTIVE:   Subjective  Subjective: Pt reports she had to help moving her mother's belongings. Reports increased soreness and difficulty finding time to complete exercises. HEP Compliance:  [x] Good [] Fair [] Poor [] Reports not doing due to:    PAIN   Location:   Pain Location: Back  Pain Rating (0-10 pain scale):  Pain Level: 5  Pain Description:    ache  Action:  [x] Acceptable for treatment  []  Other:    OBJECTIVE:   Exercises  Exercise 1: LTR 5s x 10   Exercise 2: prone props x 3 minutes  Exercise 3: hamstring stretch seated 30s x 3  Exercise 4: pelvic tilts 5s x 10  Exercise 5: NuStep L1 6 minutes  Exercise 6: piriformis stretch 30s x 3  Exercise 7: 3 way SLR x 10  Exercise 10: clamshells Ytband x 10    Manual: []  NA   Manual therapy  Soft Tissue Mobalization: STM lumbar, piriformis using tennis ball x 10 minutes    Modalities: [x]  NA    Mobility: [x]  NA    Strength: [x] NT    ROM: [x] NT    HEP  Continue with current Home Exercise Program.  See Objective section for progression of HEP. Comments:       POST-PAIN    Pain Rating (0-10 pain scale): reports low pain  Location and Pain Description same as pre-pain unless otherwise indicated.   Action: [] NA  [] Call Physician  [x] Perform HEP  [x] Meds as prescribed     ASSESSMENT: Conditions Requiring Skilled Therapeutic Intervention  Body structures, Functions, Activity limitations: Decreased functional mobility , Decreased ROM, Decreased strength, Decreased coordination, Decreased endurance, Decreased balance  Assessment: Pt reports decreased pain post PT intervention. Verbal cues and tactile cues provided throughout to improve technique. Tolerance to treatment: [x] Good   [] Fair   [] Poor  [] Fatigued   [] Increased pain   Limited by:    Goals:   Short term goals  Time Frame for Short term goals: 2 wks   Short term goal 1: Independent with HEP   Long term goals  Time Frame for Long term goals : 6 wks   Long term goal 1: Improve LE strength >/= 4+/5 to improve balance and gait quality   Long term goal 2: Improve balance >/= 52/56 to demonstrate decreased risk for falls   Long term goal 3: Ambulate with decreased Trendelenburg bilaterally x150' without AD independently   Long term goal 4: LEFS >/= 60/80 to improve overall activity tolerance     Progress toward goals: ongoing  Goals Met:    []  See updated POC   Comments:    PLAN:  [x] Continue POC to pt tolerance  [] Hold PT for ___ days.   See note to physician  [] Discharge PT    Signature:   Electronically signed by Olman Orr PT on 10/10/17 at 4:15 PM    PT Individual Minutes  Time In: 1104  Time Out: 7698  Minutes: 51       Activity Minutes Units   Ther Ex 41  2   Manual   10   1   Neuro Ed     US

## 2017-10-12 ENCOUNTER — HOSPITAL ENCOUNTER (OUTPATIENT)
Dept: PHYSICAL THERAPY | Age: 72
Setting detail: THERAPIES SERIES
Discharge: HOME | End: 2017-10-12
Payer: MEDICARE

## 2017-10-12 NOTE — PROGRESS NOTES
[facility]    [] [address]  [x] [facility]         of [address]     Physical Therapy  Cancellation/No-show Note  Patient Name:  Sofy Rust  :  1945   Date:  10/12/2017  Referring Practitioner: Dr. Aly Rivas   Diagnosis: Lt sided LBP with Lt sided sciatica, Lt leg pain     Visit Information:  PT Visit Information  Onset Date:  (5 mos )  PT Insurance Information: Medicare, [facility]   Total # of Visits Approved:  (medical necessity )  Total # of Visits to Date: 2  Plan of Care/Certification Expiration Date: 17  No Show: 0  Canceled Appointment: 1  Progress Note Counter: 2/10    For today's appointment patient:  [x]  Cancelled  []  Rescheduled appointment  []  No-show   []  Called pt to remind of next appointment     Reason given by patient:  []  Patient ill  []  Conflicting appointment  []  No transportation    []  Conflict with work  []  No reason given  [x]  Other:   Daughter called to cancel. States \"moving mother has no time to bring her in. Will call next week to schedule future appts. Advised will call them on 10/23 if have not heard from them.      Comments:       Signature: Electronically signed by Graciela Richardson PTA on 10/12/17 at 11:27 AM

## 2017-10-16 DIAGNOSIS — Z78.0 POST-MENOPAUSAL: Primary | ICD-10-CM

## 2017-10-16 NOTE — LETTER
[facility] PCP  [address] [address] [facility] [address]  Phone: [phone]  Fax: [phone]    Curry Chavez MD    October 16, 2017      [address] [address]      Dear Erik Barker,    Screening Bone Densitometry Scan was within normal limits. Continue the monthly Boniva  Repeat test in 12 months. If you have any questions or concerns, please don't hesitate to call.     Sincerely,    Curry Chavez MD

## 2017-10-26 ENCOUNTER — CLINICAL DOCUMENTATION (OUTPATIENT)
Dept: PHYSICAL THERAPY | Age: 72
End: 2017-10-26

## 2017-10-30 ENCOUNTER — HOSPITAL ENCOUNTER (OUTPATIENT)
Dept: PHYSICAL THERAPY | Age: 72
Setting detail: THERAPIES SERIES
Discharge: HOME | End: 2017-10-30
Payer: MEDICARE

## 2017-10-30 PROCEDURE — 97110 THERAPEUTIC EXERCISES: CPT

## 2017-10-30 ASSESSMENT — PAIN SCALES - GENERAL: PAINLEVEL_OUTOF10: 1

## 2017-10-30 ASSESSMENT — PAIN DESCRIPTION - ORIENTATION: ORIENTATION: LOWER

## 2017-10-30 ASSESSMENT — PAIN DESCRIPTION - LOCATION: LOCATION: BACK

## 2017-10-30 NOTE — PROGRESS NOTES
only like to schedule 1x/wk 2* caring for mother now and has restricted availability. Progressed HEP to standing ex with good og. Patient Education: cont with HEP; HP at home as needed     Tolerance to treatment: [x] Good   [] Fair   [] Poor  [] Fatigued   [] Increased pain   Limited by:    Goals:     Short term goals  Time Frame for Short term goals: 2 wks   Short term goal 1: Independent with HEP   Long term goals  Time Frame for Long term goals : 6 wks   Long term goal 1: Improve LE strength >/= 4+/5 to improve balance and gait quality   Long term goal 2: Improve balance >/= 52/56 to demonstrate decreased risk for falls   Long term goal 3: Ambulate with decreased Trendelenburg bilaterally x150' without AD independently   Long term goal 4: LEFS >/= 60/80 to improve overall activity tolerance     Progress toward goals: all  Goals Met:    []  See updated POC   Comments:    PLAN:  [x] Continue POC to pt tolerance  [] Hold PT for ___ days.   See note to physician  [] Discharge PT    Signature:   Electronically signed by Rut Boone PTA on 10/30/17 at 10:32 AM    PT Individual Minutes  Time In: 1000  Time Out: 5752  Minutes: 53  Timed Code Treatment Minutes: 53 Minutes

## 2017-11-03 ENCOUNTER — OFFICE VISIT (OUTPATIENT)
Dept: FAMILY MEDICINE CLINIC | Age: 72
End: 2017-11-03

## 2017-11-03 VITALS
HEIGHT: 61 IN | RESPIRATION RATE: 20 BRPM | TEMPERATURE: 97.7 F | HEART RATE: 68 BPM | WEIGHT: 148 LBS | SYSTOLIC BLOOD PRESSURE: 118 MMHG | BODY MASS INDEX: 27.94 KG/M2 | DIASTOLIC BLOOD PRESSURE: 82 MMHG

## 2017-11-03 DIAGNOSIS — M54.50 BILATERAL LOW BACK PAIN WITHOUT SCIATICA, UNSPECIFIED CHRONICITY: Primary | ICD-10-CM

## 2017-11-03 PROCEDURE — 3017F COLORECTAL CA SCREEN DOC REV: CPT | Performed by: FAMILY MEDICINE

## 2017-11-03 PROCEDURE — 1036F TOBACCO NON-USER: CPT | Performed by: FAMILY MEDICINE

## 2017-11-03 PROCEDURE — 3014F SCREEN MAMMO DOC REV: CPT | Performed by: FAMILY MEDICINE

## 2017-11-03 PROCEDURE — 1123F ACP DISCUSS/DSCN MKR DOCD: CPT | Performed by: FAMILY MEDICINE

## 2017-11-03 PROCEDURE — G8399 PT W/DXA RESULTS DOCUMENT: HCPCS | Performed by: FAMILY MEDICINE

## 2017-11-03 PROCEDURE — 4040F PNEUMOC VAC/ADMIN/RCVD: CPT | Performed by: FAMILY MEDICINE

## 2017-11-03 PROCEDURE — 99213 OFFICE O/P EST LOW 20 MIN: CPT | Performed by: FAMILY MEDICINE

## 2017-11-03 PROCEDURE — G8484 FLU IMMUNIZE NO ADMIN: HCPCS | Performed by: FAMILY MEDICINE

## 2017-11-03 PROCEDURE — G8417 CALC BMI ABV UP PARAM F/U: HCPCS | Performed by: FAMILY MEDICINE

## 2017-11-03 PROCEDURE — G8427 DOCREV CUR MEDS BY ELIG CLIN: HCPCS | Performed by: FAMILY MEDICINE

## 2017-11-03 PROCEDURE — 1090F PRES/ABSN URINE INCON ASSESS: CPT | Performed by: FAMILY MEDICINE

## 2017-11-03 ASSESSMENT — ENCOUNTER SYMPTOMS
COUGH: 0
EYE ITCHING: 0
EYE DISCHARGE: 0
SINUS PRESSURE: 0
CONSTIPATION: 0
BACK PAIN: 1
DIARRHEA: 0
ABDOMINAL PAIN: 0
SORE THROAT: 0
SHORTNESS OF BREATH: 0

## 2017-11-03 NOTE — PROGRESS NOTES
Subjective  Ilya Room, 81 y.o. female presents today with:  Chief Complaint   Patient presents with    Back Pain     follow up bilateral lumbar backpain, worse on Lt SI joint. She is going to PT- 1/week- helping           HPI    Patient here for follow-up low back pain with sciatica is going to therapy. No other questions and or concerns for today's visit      Review of Systems   Constitutional: Negative for appetite change, fatigue and fever. HENT: Negative for congestion, ear pain, sinus pressure and sore throat. Eyes: Negative for discharge and itching. Respiratory: Negative for cough and shortness of breath. Cardiovascular: Negative for chest pain and palpitations. Gastrointestinal: Negative for abdominal pain, constipation and diarrhea. Endocrine: Negative for polydipsia. Genitourinary: Negative for difficulty urinating. Musculoskeletal: Positive for arthralgias, back pain and myalgias. Negative for gait problem. Skin: Negative. Neurological: Negative for dizziness. Hematological: Negative. Psychiatric/Behavioral: Negative. Past Medical History:   Diagnosis Date    Anemia     Arthrosis of ankle     SEVERE BILATERALLY     Diverticulosis     Gastric polyps     HTN (hypertension) 3/1/2012    Hyperlipidemia 3/1/2012    OA (osteoarthritis)     Osteopenia     Vitamin D deficiency      Past Surgical History:   Procedure Laterality Date    ANKLE SURGERY  2010    LEFT    CARDIAC CATHETERIZATION  2008    NORMAL    CYSTOSCOPY  07/2017    Dr. Orion Odell Left 07/2017    left robotic, Dr. Alok Delacruz History     Social History    Marital status:      Spouse name: N/A    Number of children: N/A    Years of education: N/A     Occupational History    Not on file.      Social History Main Topics    Smoking status: Never Smoker    Smokeless tobacco: Never Used    Alcohol use Not on file    Drug use: Unknown    Sexual activity: Not on file     Other Topics Concern    Not on file     Social History Narrative    No narrative on file     Family History   Problem Relation Age of Onset    Diabetes Mother     High Blood Pressure Mother     Heart Disease Father     Lupus Sister     Other Brother      TB     Allergies   Allergen Reactions    Percocet [Oxycodone-Acetaminophen]      Palpitations, hallunations     Current Outpatient Prescriptions   Medication Sig Dispense Refill    celecoxib (CELEBREX) 200 MG capsule TAKE 1 CAPSULE BY MOUTH  DAILY 90 capsule 1    amLODIPine-benazepril (LOTREL) 5-10 MG per capsule TAKE 1 CAPSULE BY MOUTH  DAILY 90 capsule 1    omeprazole (PRILOSEC) 20 MG delayed release capsule TAKE 1 CAPSULE BY MOUTH  DAILY 90 capsule 1    iron polysaccharides (FERREX 150) 150 MG capsule TAKE 1 CAPSULE BY MOUTH TWICE DAILY 180 capsule 0    gabapentin (NEURONTIN) 300 MG capsule Take 1 capsule by mouth  daily 90 capsule 1    atorvastatin (LIPITOR) 10 MG tablet Take 1 tablet by mouth  daily 90 tablet 1    ibandronate (BONIVA) 150 MG tablet Take 1 tablet by mouth  every month 3 tablet 3    Calcium Carbonate (CALTRATE 600 PO) Take by mouth daily      aspirin 81 MG tablet Take 81 mg by mouth daily.  vitamin B-12 (CYANOCOBALAMIN) 500 MCG tablet Take 500 mcg by mouth 2 times daily.  Ascorbic Acid (VITAMIN C) 500 MG tablet Take 500 mg by mouth daily.  Cholecalciferol (VITAMIN D) 2000 UNITS TABS Take  by mouth daily.  Multiple Vitamin (MULTIVITAMIN PO) Take  by mouth daily. No current facility-administered medications for this visit. PMH, Surgical Hx, Family Hx, and Social Hx reviewed and updated. Health Maintenance reviewed. Objective    Vitals:    11/03/17 0722   Height: 5' 1\" (1.549 m)       Physical Exam   Constitutional: She is oriented to person, place, and time. She appears well-developed and well-nourished.    HENT:   Head:

## 2017-11-07 ENCOUNTER — HOSPITAL ENCOUNTER (OUTPATIENT)
Dept: PHYSICAL THERAPY | Age: 72
Setting detail: THERAPIES SERIES
Discharge: HOME | End: 2017-11-07
Payer: MEDICARE

## 2017-11-07 PROCEDURE — G8980 MOBILITY D/C STATUS: HCPCS

## 2017-11-07 PROCEDURE — G8979 MOBILITY GOAL STATUS: HCPCS

## 2017-11-07 PROCEDURE — 97110 THERAPEUTIC EXERCISES: CPT

## 2017-11-07 PROCEDURE — G8978 MOBILITY CURRENT STATUS: HCPCS

## 2017-11-07 PROCEDURE — 97112 NEUROMUSCULAR REEDUCATION: CPT

## 2017-11-07 ASSESSMENT — PAIN DESCRIPTION - LOCATION: LOCATION: BACK

## 2017-11-07 ASSESSMENT — PAIN SCALES - GENERAL: PAINLEVEL_OUTOF10: 2

## 2017-11-07 ASSESSMENT — PAIN DESCRIPTION - ORIENTATION: ORIENTATION: LEFT;LOWER

## 2017-11-07 NOTE — PROGRESS NOTES
[facility]   Outpatient Physical Therapy   Treatment Note  [] [address]  [x] [facility]            of [address]  Date: 2017  Patient: Maritza Singh  : 1945  ACCT #: [de-identified]  Referring Practitioner: Dr. Edmund Cutler   Diagnosis: Lt sided LBP with Lt sided sciatica, Lt leg pain     Visit Information:  PT Visit Information  Onset Date:  (5 mos )  PT Insurance Information: Medicare, [facility]   Total # of Visits Approved:  (medical necessity )  Total # of Visits to Date: 4  Plan of Care/Certification Expiration Date: 17  No Show: 0  Canceled Appointment: 1  Progress Note Counter: 4/10    SUBJECTIVE:   Subjective  Subjective: Reports worst pain is 5/10, also having pain at night. However, is able to sleep on left side for short duration. HEP Compliance:  [x] Good [] Fair [] Poor [] Reports not doing due to:    PAIN   Location:   Pain Location: Back  Pain Rating (0-10 pain scale):  Pain Level: 2  Pain Description:     Action:  [x] Acceptable for treatment  []  Other:    OBJECTIVE:   Exercises  Exercise 1: LTR 20S/5  Exercise 2: prone press ups 50% 10x  Exercise 3: hamstring stretch with belt 30s/3  Exercise 4: pelvic tilts 15x  Exercise 6: seated piriformis 30s/3  Exercise 7: 3 way SLR x 10  Exercise 8: sink ex 10x  Exercise 11: wall ext stretch 30s/3  Exercise 20: HEP: standing lumbar ext, prone press up, hamstring with belt, seated piriformis str    Manual: []  NA   Manual therapy  Joint mobilization: lumbar PA mobs  Soft Tissue Mobalization: STM/DTM left low back musculature    Modalities: [x]  NA    Mobility: [x]  NA    Strength: [x] NT    ROM: [x] NT    HEP  Continue with current Home Exercise Program.  See Objective section for progression of HEP. Comments:       POST-PAIN    Pain Rating (0-10 pain scale): 0/10  Location and Pain Description same as pre-pain unless otherwise indicated.   Action: [] NA  [] Call Physician  [x] Perform

## 2017-11-21 RX ORDER — GABAPENTIN 300 MG/1
CAPSULE ORAL
Qty: 90 CAPSULE | Refills: 1 | Status: SHIPPED | OUTPATIENT
Start: 2017-11-21 | End: 2018-05-06 | Stop reason: SDUPTHER

## 2017-12-05 ENCOUNTER — TELEPHONE (OUTPATIENT)
Dept: UROLOGY | Age: 72
End: 2017-12-05

## 2017-12-05 DIAGNOSIS — D64.9 ANEMIA, UNSPECIFIED TYPE: ICD-10-CM

## 2017-12-05 DIAGNOSIS — N28.9 RENAL INSUFFICIENCY: ICD-10-CM

## 2017-12-05 DIAGNOSIS — N28.9 RENAL INSUFFICIENCY: Primary | ICD-10-CM

## 2017-12-05 LAB
ANION GAP SERPL CALCULATED.3IONS-SCNC: 13 MEQ/L (ref 7–13)
BUN BLDV-MCNC: 22 MG/DL (ref 8–23)
CALCIUM SERPL-MCNC: 9.6 MG/DL (ref 8.6–10.2)
CHLORIDE BLD-SCNC: 104 MEQ/L (ref 98–107)
CO2: 26 MEQ/L (ref 22–29)
CREAT SERPL-MCNC: 1.07 MG/DL (ref 0.5–0.9)
GFR AFRICAN AMERICAN: >60
GFR NON-AFRICAN AMERICAN: 50.6
GLUCOSE BLD-MCNC: 86 MG/DL (ref 74–109)
HCT VFR BLD CALC: 36.8 % (ref 37–47)
HEMOGLOBIN: 11.9 G/DL (ref 12–16)
MCH RBC QN AUTO: 28.5 PG (ref 27–31.3)
MCHC RBC AUTO-ENTMCNC: 32.3 % (ref 33–37)
MCV RBC AUTO: 88.4 FL (ref 82–100)
PDW BLD-RTO: 14.1 % (ref 11.5–14.5)
PLATELET # BLD: 196 K/UL (ref 130–400)
POTASSIUM SERPL-SCNC: 4.3 MEQ/L (ref 3.5–5.1)
RBC # BLD: 4.17 M/UL (ref 4.2–5.4)
SODIUM BLD-SCNC: 143 MEQ/L (ref 132–144)
WBC # BLD: 3.9 K/UL (ref 4.8–10.8)

## 2017-12-11 ENCOUNTER — OFFICE VISIT (OUTPATIENT)
Dept: UROLOGY | Age: 72
End: 2017-12-11

## 2017-12-11 VITALS
WEIGHT: 145 LBS | BODY MASS INDEX: 29.23 KG/M2 | SYSTOLIC BLOOD PRESSURE: 110 MMHG | DIASTOLIC BLOOD PRESSURE: 80 MMHG | HEART RATE: 60 BPM | HEIGHT: 59 IN

## 2017-12-11 DIAGNOSIS — C64.2: Primary | ICD-10-CM

## 2017-12-11 PROCEDURE — 3014F SCREEN MAMMO DOC REV: CPT | Performed by: UROLOGY

## 2017-12-11 PROCEDURE — G8417 CALC BMI ABV UP PARAM F/U: HCPCS | Performed by: UROLOGY

## 2017-12-11 PROCEDURE — 4040F PNEUMOC VAC/ADMIN/RCVD: CPT | Performed by: UROLOGY

## 2017-12-11 PROCEDURE — 1123F ACP DISCUSS/DSCN MKR DOCD: CPT | Performed by: UROLOGY

## 2017-12-11 PROCEDURE — 3017F COLORECTAL CA SCREEN DOC REV: CPT | Performed by: UROLOGY

## 2017-12-11 PROCEDURE — 1036F TOBACCO NON-USER: CPT | Performed by: UROLOGY

## 2017-12-11 PROCEDURE — 1090F PRES/ABSN URINE INCON ASSESS: CPT | Performed by: UROLOGY

## 2017-12-11 PROCEDURE — G8484 FLU IMMUNIZE NO ADMIN: HCPCS | Performed by: UROLOGY

## 2017-12-11 PROCEDURE — G8399 PT W/DXA RESULTS DOCUMENT: HCPCS | Performed by: UROLOGY

## 2017-12-11 PROCEDURE — G8427 DOCREV CUR MEDS BY ELIG CLIN: HCPCS | Performed by: UROLOGY

## 2017-12-11 PROCEDURE — 99213 OFFICE O/P EST LOW 20 MIN: CPT | Performed by: UROLOGY

## 2017-12-11 NOTE — PROGRESS NOTES
Smoker    Smokeless tobacco: Never Used    Alcohol use None    Drug use: Unknown    Sexual activity: Not Asked     Other Topics Concern    None     Social History Narrative    None     Family History   Problem Relation Age of Onset    Diabetes Mother     High Blood Pressure Mother     Heart Disease Father     Lupus Sister     Other Brother      TB     Current Outpatient Prescriptions   Medication Sig Dispense Refill    gabapentin (NEURONTIN) 300 MG capsule TAKE 1 CAPSULE BY MOUTH  DAILY 90 capsule 1    celecoxib (CELEBREX) 200 MG capsule TAKE 1 CAPSULE BY MOUTH  DAILY 90 capsule 1    amLODIPine-benazepril (LOTREL) 5-10 MG per capsule TAKE 1 CAPSULE BY MOUTH  DAILY 90 capsule 1    omeprazole (PRILOSEC) 20 MG delayed release capsule TAKE 1 CAPSULE BY MOUTH  DAILY 90 capsule 1    iron polysaccharides (FERREX 150) 150 MG capsule TAKE 1 CAPSULE BY MOUTH TWICE DAILY 180 capsule 0    atorvastatin (LIPITOR) 10 MG tablet Take 1 tablet by mouth  daily 90 tablet 1    ibandronate (BONIVA) 150 MG tablet Take 1 tablet by mouth  every month 3 tablet 3    Calcium Carbonate (CALTRATE 600 PO) Take by mouth daily      aspirin 81 MG tablet Take 81 mg by mouth daily.  vitamin B-12 (CYANOCOBALAMIN) 500 MCG tablet Take 500 mcg by mouth 2 times daily.  Ascorbic Acid (VITAMIN C) 500 MG tablet Take 500 mg by mouth daily.  Cholecalciferol (VITAMIN D) 2000 UNITS TABS Take  by mouth daily.  Multiple Vitamin (MULTIVITAMIN PO) Take  by mouth daily. No current facility-administered medications for this visit. Percocet [oxycodone-acetaminophen]  All reviewed and verified by Dr Clarence Ambriz on today's visit    No results found for: PSA, PSADIA  No results found for this visit on 12/11/17. Physical Exam  Vitals:    12/11/17 1015   BP: 110/80   Pulse: 60   Weight: 145 lb (65.8 kg)   Height: 4' 11\" (1.499 m)     Constitutional: patient is oriented to person, place, and time.  patient appears

## 2017-12-12 ENCOUNTER — HOSPITAL ENCOUNTER (OUTPATIENT)
Dept: CT IMAGING | Age: 72
Discharge: HOME | End: 2017-12-12
Payer: MEDICARE

## 2017-12-12 DIAGNOSIS — C64.2: ICD-10-CM

## 2017-12-12 PROCEDURE — 74150 CT ABDOMEN W/O CONTRAST: CPT

## 2017-12-22 NOTE — TELEPHONE ENCOUNTER
From: Beulah Carter  Sent: 12/22/2017 1:03 PM EST  Subject: Medication Renewal Request    Amairani Campuzano would like a refill of the following medications:  iron polysaccharides (FERREX 150) 150 MG capsule Noam Lopez MD]    Preferred pharmacy: [place] [address] [phone] F [phone]    Comment:

## 2017-12-23 RX ORDER — IRON POLYSACCHARIDE COMPLEX 150 MG
CAPSULE ORAL
Qty: 180 CAPSULE | Refills: 2 | Status: SHIPPED | OUTPATIENT
Start: 2017-12-23 | End: 2018-09-04 | Stop reason: SDUPTHER

## 2018-01-04 ENCOUNTER — CLINICAL DOCUMENTATION (OUTPATIENT)
Dept: PHYSICAL THERAPY | Age: 73
End: 2018-01-04

## 2018-01-08 ENCOUNTER — CLINICAL DOCUMENTATION (OUTPATIENT)
Dept: PHYSICAL THERAPY | Age: 73
End: 2018-01-08

## 2018-01-08 NOTE — PROGRESS NOTES
[facility]    []  [address] [x]  [address]     [address] [place] [address]  Ph: [phone]     Ph: [phone]  Fax: [phone]     Fax: [phone]    [] Certification  [] Recertification []  Plan of Care  [] Progress Note [x] Discharge    Date: 2018  Patient Name: Angelic Warner  : 1945  MRN: [id number]    To:   Dr. Jose Carlos Odell  From: Ander Arevalo PT     [x]  Patient no show/ no call on: 11/15     [x]   Patient canceled on: 10/12    Comments: Pt was seen for evaluation and three follow-up appointments. Pt did not show 11/15/17 and did not call to reschedule. Contacted pt 18. Requests discharge at this time due to \"not a good time to come\". Will need new Rx if/when able to resume PT in the future. Thank you for your referral and the opportunity to treat this patient. Please contact us with any questions or concerns.   Electronically signed by Ander Arevalo PT on 2018 at 12:47 PM

## 2018-02-02 ENCOUNTER — OFFICE VISIT (OUTPATIENT)
Dept: FAMILY MEDICINE CLINIC | Age: 73
End: 2018-02-02
Payer: MEDICARE

## 2018-02-02 VITALS
HEART RATE: 74 BPM | RESPIRATION RATE: 16 BRPM | WEIGHT: 150 LBS | SYSTOLIC BLOOD PRESSURE: 108 MMHG | TEMPERATURE: 97.8 F | BODY MASS INDEX: 30.24 KG/M2 | HEIGHT: 59 IN | DIASTOLIC BLOOD PRESSURE: 68 MMHG

## 2018-02-02 DIAGNOSIS — M54.42 LEFT-SIDED LOW BACK PAIN WITH LEFT-SIDED SCIATICA, UNSPECIFIED CHRONICITY: Primary | ICD-10-CM

## 2018-02-02 DIAGNOSIS — M25.552 HIP PAIN, LEFT: ICD-10-CM

## 2018-02-02 PROCEDURE — 3017F COLORECTAL CA SCREEN DOC REV: CPT | Performed by: FAMILY MEDICINE

## 2018-02-02 PROCEDURE — G8484 FLU IMMUNIZE NO ADMIN: HCPCS | Performed by: FAMILY MEDICINE

## 2018-02-02 PROCEDURE — 4040F PNEUMOC VAC/ADMIN/RCVD: CPT | Performed by: FAMILY MEDICINE

## 2018-02-02 PROCEDURE — 3014F SCREEN MAMMO DOC REV: CPT | Performed by: FAMILY MEDICINE

## 2018-02-02 PROCEDURE — G8399 PT W/DXA RESULTS DOCUMENT: HCPCS | Performed by: FAMILY MEDICINE

## 2018-02-02 PROCEDURE — 1123F ACP DISCUSS/DSCN MKR DOCD: CPT | Performed by: FAMILY MEDICINE

## 2018-02-02 PROCEDURE — G8427 DOCREV CUR MEDS BY ELIG CLIN: HCPCS | Performed by: FAMILY MEDICINE

## 2018-02-02 PROCEDURE — 1036F TOBACCO NON-USER: CPT | Performed by: FAMILY MEDICINE

## 2018-02-02 PROCEDURE — G8417 CALC BMI ABV UP PARAM F/U: HCPCS | Performed by: FAMILY MEDICINE

## 2018-02-02 PROCEDURE — 99213 OFFICE O/P EST LOW 20 MIN: CPT | Performed by: FAMILY MEDICINE

## 2018-02-02 PROCEDURE — 1090F PRES/ABSN URINE INCON ASSESS: CPT | Performed by: FAMILY MEDICINE

## 2018-02-02 ASSESSMENT — ENCOUNTER SYMPTOMS
SINUS PRESSURE: 0
BACK PAIN: 1
EYE ITCHING: 0
SHORTNESS OF BREATH: 0
SORE THROAT: 0
CONSTIPATION: 0
ABDOMINAL PAIN: 0
COUGH: 0
DIARRHEA: 0
EYE DISCHARGE: 0

## 2018-02-02 NOTE — PROGRESS NOTES
Exam   Constitutional: She is oriented to person, place, and time. She appears well-developed and well-nourished. HENT:   Head: Normocephalic. Mouth/Throat: Oropharynx is clear and moist.   Eyes: Pupils are equal, round, and reactive to light. Neck: Normal range of motion. Neck supple. No thyromegaly present. Cardiovascular: Normal rate and intact distal pulses. Exam reveals no gallop and no friction rub. No murmur heard. Pulmonary/Chest: Effort normal and breath sounds normal.   Abdominal: Soft. Bowel sounds are normal.   Musculoskeletal: Normal range of motion. Neurological: She is alert and oriented to person, place, and time. Skin: Skin is warm and dry. Psychiatric: She has a normal mood and affect. Her behavior is normal.     Pain is still resolved sciatica is not a significant problem and a longer because of the weather she was able to go to the last tooth PT meetings but she has been doing all the exercises and stretches and will continue to do that at home. She has full range of motion of the back except for underlying arthritis restriction and no sciatic notch pain no paresthesias in the thighs. Plan is to keep her doing the stretches were more soaks  Assessment & Plan   1. Left-sided low back pain with left-sided sciatica, unspecified chronicity     2. Hip pain, left       No orders of the defined types were placed in this encounter. No orders of the defined types were placed in this encounter. There are no discontinued medications. Return in about 3 months (around 5/2/2018).         Controlled Substances Monitoring:          Aure Mayers MD

## 2018-03-05 RX ORDER — ATORVASTATIN CALCIUM 10 MG/1
TABLET, FILM COATED ORAL
Qty: 90 TABLET | Refills: 1 | Status: SHIPPED | OUTPATIENT
Start: 2018-03-05 | End: 2018-11-14 | Stop reason: SDUPTHER

## 2018-03-06 RX ORDER — IBANDRONATE SODIUM 150 MG/1
TABLET, FILM COATED ORAL
Qty: 3 TABLET | Refills: 1 | Status: SHIPPED | OUTPATIENT
Start: 2018-03-06 | End: 2018-10-31 | Stop reason: SDUPTHER

## 2018-03-22 RX ORDER — CELECOXIB 200 MG/1
CAPSULE ORAL
Qty: 90 CAPSULE | Refills: 1 | Status: SHIPPED | OUTPATIENT
Start: 2018-03-22 | End: 2018-08-08 | Stop reason: SDUPTHER

## 2018-03-22 RX ORDER — OMEPRAZOLE 20 MG/1
CAPSULE, DELAYED RELEASE ORAL
Qty: 90 CAPSULE | Refills: 3 | Status: SHIPPED | OUTPATIENT
Start: 2018-03-22

## 2018-03-22 RX ORDER — AMLODIPINE BESYLATE AND BENAZEPRIL HYDROCHLORIDE 5; 10 MG/1; MG/1
CAPSULE ORAL
Qty: 90 CAPSULE | Refills: 1 | Status: SHIPPED | OUTPATIENT
Start: 2018-03-22 | End: 2018-08-08 | Stop reason: SDUPTHER

## 2018-05-02 ENCOUNTER — OFFICE VISIT (OUTPATIENT)
Dept: FAMILY MEDICINE CLINIC | Age: 73
End: 2018-05-02
Payer: MEDICARE

## 2018-05-02 VITALS
BODY MASS INDEX: 30.64 KG/M2 | RESPIRATION RATE: 16 BRPM | HEART RATE: 71 BPM | SYSTOLIC BLOOD PRESSURE: 118 MMHG | DIASTOLIC BLOOD PRESSURE: 82 MMHG | WEIGHT: 152 LBS | TEMPERATURE: 97.7 F | HEIGHT: 59 IN

## 2018-05-02 DIAGNOSIS — D64.9 ANEMIA, UNSPECIFIED TYPE: ICD-10-CM

## 2018-05-02 DIAGNOSIS — I10 ESSENTIAL HYPERTENSION: ICD-10-CM

## 2018-05-02 DIAGNOSIS — Z12.31 VISIT FOR SCREENING MAMMOGRAM: ICD-10-CM

## 2018-05-02 DIAGNOSIS — E55.9 VITAMIN D DEFICIENCY: ICD-10-CM

## 2018-05-02 DIAGNOSIS — E78.5 HYPERLIPIDEMIA, UNSPECIFIED HYPERLIPIDEMIA TYPE: ICD-10-CM

## 2018-05-02 DIAGNOSIS — I10 ESSENTIAL HYPERTENSION: Primary | ICD-10-CM

## 2018-05-02 LAB
ALBUMIN SERPL-MCNC: 4.2 G/DL (ref 3.9–4.9)
ALP BLD-CCNC: 75 U/L (ref 40–130)
ALT SERPL-CCNC: 12 U/L (ref 0–33)
ANION GAP SERPL CALCULATED.3IONS-SCNC: 11 MEQ/L (ref 7–13)
AST SERPL-CCNC: 21 U/L (ref 0–35)
BASOPHILS ABSOLUTE: 0 K/UL (ref 0–0.2)
BASOPHILS RELATIVE PERCENT: 1.1 %
BILIRUB SERPL-MCNC: 0.9 MG/DL (ref 0–1.2)
BUN BLDV-MCNC: 20 MG/DL (ref 8–23)
CALCIUM SERPL-MCNC: 10 MG/DL (ref 8.6–10.2)
CHLORIDE BLD-SCNC: 104 MEQ/L (ref 98–107)
CHOLESTEROL, TOTAL: 151 MG/DL (ref 0–199)
CO2: 28 MEQ/L (ref 22–29)
CREAT SERPL-MCNC: 1.13 MG/DL (ref 0.5–0.9)
EOSINOPHILS ABSOLUTE: 0.2 K/UL (ref 0–0.7)
EOSINOPHILS RELATIVE PERCENT: 5 %
GFR AFRICAN AMERICAN: 57.5
GFR NON-AFRICAN AMERICAN: 47.5
GLOBULIN: 1.8 G/DL (ref 2.3–3.5)
GLUCOSE BLD-MCNC: 88 MG/DL (ref 74–109)
HCT VFR BLD CALC: 35.6 % (ref 37–47)
HDLC SERPL-MCNC: 66 MG/DL (ref 40–59)
HEMOGLOBIN: 12 G/DL (ref 12–16)
LDL CHOLESTEROL CALCULATED: 68 MG/DL (ref 0–129)
LYMPHOCYTES ABSOLUTE: 1.2 K/UL (ref 1–4.8)
LYMPHOCYTES RELATIVE PERCENT: 33.6 %
MCH RBC QN AUTO: 29.5 PG (ref 27–31.3)
MCHC RBC AUTO-ENTMCNC: 33.7 % (ref 33–37)
MCV RBC AUTO: 87.6 FL (ref 82–100)
MONOCYTES ABSOLUTE: 0.3 K/UL (ref 0.2–0.8)
MONOCYTES RELATIVE PERCENT: 9.2 %
NEUTROPHILS ABSOLUTE: 1.9 K/UL (ref 1.4–6.5)
NEUTROPHILS RELATIVE PERCENT: 51.1 %
PDW BLD-RTO: 13.5 % (ref 11.5–14.5)
PLATELET # BLD: 152 K/UL (ref 130–400)
POTASSIUM SERPL-SCNC: 4.6 MEQ/L (ref 3.5–5.1)
RBC # BLD: 4.06 M/UL (ref 4.2–5.4)
SLIDE REVIEW: ABNORMAL
SODIUM BLD-SCNC: 143 MEQ/L (ref 132–144)
TOTAL PROTEIN: 6 G/DL (ref 6.4–8.1)
TRIGL SERPL-MCNC: 85 MG/DL (ref 0–200)
VITAMIN D 25-HYDROXY: 58.9 NG/ML (ref 30–100)
WBC # BLD: 3.7 K/UL (ref 4.8–10.8)

## 2018-05-02 PROCEDURE — 1123F ACP DISCUSS/DSCN MKR DOCD: CPT | Performed by: FAMILY MEDICINE

## 2018-05-02 PROCEDURE — G8427 DOCREV CUR MEDS BY ELIG CLIN: HCPCS | Performed by: FAMILY MEDICINE

## 2018-05-02 PROCEDURE — 1036F TOBACCO NON-USER: CPT | Performed by: FAMILY MEDICINE

## 2018-05-02 PROCEDURE — 99214 OFFICE O/P EST MOD 30 MIN: CPT | Performed by: FAMILY MEDICINE

## 2018-05-02 PROCEDURE — G8417 CALC BMI ABV UP PARAM F/U: HCPCS | Performed by: FAMILY MEDICINE

## 2018-05-02 PROCEDURE — G8399 PT W/DXA RESULTS DOCUMENT: HCPCS | Performed by: FAMILY MEDICINE

## 2018-05-02 PROCEDURE — 1090F PRES/ABSN URINE INCON ASSESS: CPT | Performed by: FAMILY MEDICINE

## 2018-05-02 PROCEDURE — 3017F COLORECTAL CA SCREEN DOC REV: CPT | Performed by: FAMILY MEDICINE

## 2018-05-02 PROCEDURE — 4040F PNEUMOC VAC/ADMIN/RCVD: CPT | Performed by: FAMILY MEDICINE

## 2018-05-02 ASSESSMENT — ENCOUNTER SYMPTOMS
DIARRHEA: 0
COUGH: 0
SHORTNESS OF BREATH: 0
SINUS PRESSURE: 0
ABDOMINAL PAIN: 0
EYE ITCHING: 0
EYE DISCHARGE: 0
CONSTIPATION: 0
SORE THROAT: 0

## 2018-05-02 ASSESSMENT — PATIENT HEALTH QUESTIONNAIRE - PHQ9
SUM OF ALL RESPONSES TO PHQ9 QUESTIONS 1 & 2: 0
2. FEELING DOWN, DEPRESSED OR HOPELESS: 0
SUM OF ALL RESPONSES TO PHQ QUESTIONS 1-9: 0
1. LITTLE INTEREST OR PLEASURE IN DOING THINGS: 0

## 2018-05-07 RX ORDER — GABAPENTIN 300 MG/1
CAPSULE ORAL
Qty: 90 CAPSULE | Refills: 2 | Status: SHIPPED | OUTPATIENT
Start: 2018-05-07 | End: 2019-01-25 | Stop reason: SDUPTHER

## 2018-06-11 ENCOUNTER — OFFICE VISIT (OUTPATIENT)
Dept: UROLOGY | Age: 73
End: 2018-06-11
Payer: MEDICARE

## 2018-06-11 VITALS
HEART RATE: 62 BPM | HEIGHT: 59 IN | WEIGHT: 145 LBS | SYSTOLIC BLOOD PRESSURE: 118 MMHG | BODY MASS INDEX: 29.23 KG/M2 | DIASTOLIC BLOOD PRESSURE: 82 MMHG

## 2018-06-11 DIAGNOSIS — D17.71 ANGIOMYOLIPOMA OF RIGHT KIDNEY: ICD-10-CM

## 2018-06-11 DIAGNOSIS — C64.2 RENAL CELL ADENOCARCINOMA, LEFT (HCC): Primary | ICD-10-CM

## 2018-06-11 PROCEDURE — 99213 OFFICE O/P EST LOW 20 MIN: CPT | Performed by: UROLOGY

## 2018-06-11 PROCEDURE — 1090F PRES/ABSN URINE INCON ASSESS: CPT | Performed by: UROLOGY

## 2018-06-11 PROCEDURE — 4040F PNEUMOC VAC/ADMIN/RCVD: CPT | Performed by: UROLOGY

## 2018-06-11 PROCEDURE — 1036F TOBACCO NON-USER: CPT | Performed by: UROLOGY

## 2018-06-11 PROCEDURE — 1123F ACP DISCUSS/DSCN MKR DOCD: CPT | Performed by: UROLOGY

## 2018-06-11 PROCEDURE — G8417 CALC BMI ABV UP PARAM F/U: HCPCS | Performed by: UROLOGY

## 2018-06-11 PROCEDURE — 3017F COLORECTAL CA SCREEN DOC REV: CPT | Performed by: UROLOGY

## 2018-06-11 PROCEDURE — G8399 PT W/DXA RESULTS DOCUMENT: HCPCS | Performed by: UROLOGY

## 2018-06-11 PROCEDURE — G8427 DOCREV CUR MEDS BY ELIG CLIN: HCPCS | Performed by: UROLOGY

## 2018-06-12 ENCOUNTER — HOSPITAL ENCOUNTER (OUTPATIENT)
Dept: CT IMAGING | Age: 73
Discharge: HOME | End: 2018-06-14
Payer: MEDICARE

## 2018-06-12 ENCOUNTER — TELEPHONE (OUTPATIENT)
Dept: UROLOGY | Age: 73
End: 2018-06-12

## 2018-06-12 DIAGNOSIS — D17.71 ANGIOMYOLIPOMA OF RIGHT KIDNEY: ICD-10-CM

## 2018-06-12 DIAGNOSIS — C64.2 RENAL CELL ADENOCARCINOMA, LEFT (HCC): Primary | ICD-10-CM

## 2018-06-12 DIAGNOSIS — C64.2 RENAL CELL ADENOCARCINOMA, LEFT (HCC): ICD-10-CM

## 2018-06-12 PROCEDURE — 74176 CT ABD & PELVIS W/O CONTRAST: CPT

## 2018-06-12 RX ORDER — SODIUM CHLORIDE 0.9 % (FLUSH) 0.9 %
10 SYRINGE (ML) INJECTION
Status: DISCONTINUED | OUTPATIENT
Start: 2018-06-12 | End: 2018-06-12

## 2018-07-23 RX ORDER — ATORVASTATIN CALCIUM 10 MG/1
TABLET, FILM COATED ORAL
Qty: 90 TABLET | OUTPATIENT
Start: 2018-07-23

## 2018-08-08 RX ORDER — AMLODIPINE BESYLATE AND BENAZEPRIL HYDROCHLORIDE 5; 10 MG/1; MG/1
CAPSULE ORAL
Qty: 90 CAPSULE | Refills: 1 | Status: SHIPPED | OUTPATIENT
Start: 2018-08-08 | End: 2019-01-18 | Stop reason: SDUPTHER

## 2018-08-08 RX ORDER — CELECOXIB 200 MG/1
CAPSULE ORAL
Qty: 90 CAPSULE | Refills: 1 | Status: SHIPPED | OUTPATIENT
Start: 2018-08-08 | End: 2019-01-18 | Stop reason: SDUPTHER

## 2018-09-04 RX ORDER — IRON POLYSACCHARIDE COMPLEX 150 MG
CAPSULE ORAL
Qty: 180 CAPSULE | Refills: 1 | Status: SHIPPED | OUTPATIENT
Start: 2018-09-04 | End: 2019-02-21 | Stop reason: SDUPTHER

## 2018-10-31 DIAGNOSIS — M85.80 OSTEOPENIA, UNSPECIFIED LOCATION: Primary | ICD-10-CM

## 2018-10-31 RX ORDER — IBANDRONATE SODIUM 150 MG/1
TABLET, FILM COATED ORAL
Qty: 3 TABLET | Refills: 1 | Status: SHIPPED | OUTPATIENT
Start: 2018-10-31

## 2018-11-02 ENCOUNTER — OFFICE VISIT (OUTPATIENT)
Dept: FAMILY MEDICINE CLINIC | Age: 73
End: 2018-11-02
Payer: MEDICARE

## 2018-11-02 VITALS
RESPIRATION RATE: 12 BRPM | HEIGHT: 59 IN | WEIGHT: 152 LBS | BODY MASS INDEX: 30.64 KG/M2 | HEART RATE: 61 BPM | DIASTOLIC BLOOD PRESSURE: 82 MMHG | TEMPERATURE: 96.9 F | OXYGEN SATURATION: 99 % | SYSTOLIC BLOOD PRESSURE: 132 MMHG

## 2018-11-02 DIAGNOSIS — D64.9 ANEMIA, UNSPECIFIED TYPE: ICD-10-CM

## 2018-11-02 DIAGNOSIS — Z23 NEED FOR INFLUENZA VACCINATION: ICD-10-CM

## 2018-11-02 DIAGNOSIS — I10 ESSENTIAL HYPERTENSION: ICD-10-CM

## 2018-11-02 DIAGNOSIS — I10 ESSENTIAL HYPERTENSION: Primary | ICD-10-CM

## 2018-11-02 DIAGNOSIS — E78.5 HYPERLIPIDEMIA, UNSPECIFIED HYPERLIPIDEMIA TYPE: ICD-10-CM

## 2018-11-02 LAB
ALBUMIN SERPL-MCNC: 4.4 G/DL (ref 3.9–4.9)
ALP BLD-CCNC: 70 U/L (ref 40–130)
ALT SERPL-CCNC: 19 U/L (ref 0–33)
ANION GAP SERPL CALCULATED.3IONS-SCNC: 14 MEQ/L (ref 7–13)
AST SERPL-CCNC: 31 U/L (ref 0–35)
BASOPHILS ABSOLUTE: 0 K/UL (ref 0–0.2)
BASOPHILS RELATIVE PERCENT: 1.2 %
BILIRUB SERPL-MCNC: 0.7 MG/DL (ref 0–1.2)
BUN BLDV-MCNC: 17 MG/DL (ref 8–23)
CALCIUM SERPL-MCNC: 9.5 MG/DL (ref 8.6–10.2)
CHLORIDE BLD-SCNC: 103 MEQ/L (ref 98–107)
CHOLESTEROL, TOTAL: 157 MG/DL (ref 0–199)
CO2: 28 MEQ/L (ref 22–29)
CREAT SERPL-MCNC: 1.14 MG/DL (ref 0.5–0.9)
EOSINOPHILS ABSOLUTE: 0.2 K/UL (ref 0–0.7)
EOSINOPHILS RELATIVE PERCENT: 6 %
GFR AFRICAN AMERICAN: 56.8
GFR NON-AFRICAN AMERICAN: 46.9
GLOBULIN: 2.1 G/DL (ref 2.3–3.5)
GLUCOSE BLD-MCNC: 87 MG/DL (ref 74–109)
HCT VFR BLD CALC: 37.5 % (ref 37–47)
HDLC SERPL-MCNC: 68 MG/DL (ref 40–59)
HEMOGLOBIN: 12.5 G/DL (ref 12–16)
LDL CHOLESTEROL CALCULATED: 61 MG/DL (ref 0–129)
LYMPHOCYTES ABSOLUTE: 1 K/UL (ref 1–4.8)
LYMPHOCYTES RELATIVE PERCENT: 27 %
MCH RBC QN AUTO: 29.4 PG (ref 27–31.3)
MCHC RBC AUTO-ENTMCNC: 33.4 % (ref 33–37)
MCV RBC AUTO: 88.2 FL (ref 82–100)
MONOCYTES ABSOLUTE: 0.1 K/UL (ref 0.2–0.8)
MONOCYTES RELATIVE PERCENT: 3.8 %
NEUTROPHILS ABSOLUTE: 2.3 K/UL (ref 1.4–6.5)
NEUTROPHILS RELATIVE PERCENT: 63 %
PDW BLD-RTO: 13.7 % (ref 11.5–14.5)
PLATELET # BLD: 165 K/UL (ref 130–400)
PLATELET SLIDE REVIEW: ADEQUATE
POIKILOCYTES: ABNORMAL
POTASSIUM SERPL-SCNC: 4.6 MEQ/L (ref 3.5–5.1)
RBC # BLD: 4.26 M/UL (ref 4.2–5.4)
SODIUM BLD-SCNC: 145 MEQ/L (ref 132–144)
TOTAL PROTEIN: 6.5 G/DL (ref 6.4–8.1)
TRIGL SERPL-MCNC: 141 MG/DL (ref 0–200)
WBC # BLD: 3.7 K/UL (ref 4.8–10.8)

## 2018-11-02 PROCEDURE — 1036F TOBACCO NON-USER: CPT | Performed by: FAMILY MEDICINE

## 2018-11-02 PROCEDURE — 99213 OFFICE O/P EST LOW 20 MIN: CPT | Performed by: FAMILY MEDICINE

## 2018-11-02 PROCEDURE — G8417 CALC BMI ABV UP PARAM F/U: HCPCS | Performed by: FAMILY MEDICINE

## 2018-11-02 PROCEDURE — 1101F PT FALLS ASSESS-DOCD LE1/YR: CPT | Performed by: FAMILY MEDICINE

## 2018-11-02 PROCEDURE — G8399 PT W/DXA RESULTS DOCUMENT: HCPCS | Performed by: FAMILY MEDICINE

## 2018-11-02 PROCEDURE — 1123F ACP DISCUSS/DSCN MKR DOCD: CPT | Performed by: FAMILY MEDICINE

## 2018-11-02 PROCEDURE — 1090F PRES/ABSN URINE INCON ASSESS: CPT | Performed by: FAMILY MEDICINE

## 2018-11-02 PROCEDURE — 3017F COLORECTAL CA SCREEN DOC REV: CPT | Performed by: FAMILY MEDICINE

## 2018-11-02 PROCEDURE — 4040F PNEUMOC VAC/ADMIN/RCVD: CPT | Performed by: FAMILY MEDICINE

## 2018-11-02 PROCEDURE — G8482 FLU IMMUNIZE ORDER/ADMIN: HCPCS | Performed by: FAMILY MEDICINE

## 2018-11-02 PROCEDURE — G8427 DOCREV CUR MEDS BY ELIG CLIN: HCPCS | Performed by: FAMILY MEDICINE

## 2018-11-02 PROCEDURE — 90662 IIV NO PRSV INCREASED AG IM: CPT | Performed by: FAMILY MEDICINE

## 2018-11-02 PROCEDURE — G0008 ADMIN INFLUENZA VIRUS VAC: HCPCS | Performed by: FAMILY MEDICINE

## 2018-11-02 ASSESSMENT — ENCOUNTER SYMPTOMS
DIARRHEA: 0
COUGH: 0
SINUS PRESSURE: 0
SORE THROAT: 0
EYE DISCHARGE: 0
EYES NEGATIVE: 1
EYE ITCHING: 0
ABDOMINAL PAIN: 0
NAUSEA: 0
CONSTIPATION: 0
SHORTNESS OF BREATH: 0

## 2018-11-02 NOTE — PROGRESS NOTES
Subjective  Fer Alfaro, 72 y.o. female presents today with:  Chief Complaint   Patient presents with    Hypertension     Patient in office being seen for a follow up HTN and hyperlipidemia    Hyperlipidemia           HPI    ***    No other questions and or concerns for today's visit      Review of Systems   Constitutional: Negative for appetite change, fatigue and fever. HENT: Negative for congestion, ear pain, sinus pressure and sore throat. Eyes: Negative for discharge and itching. Respiratory: Negative for cough and shortness of breath. Cardiovascular: Negative for chest pain and palpitations. Gastrointestinal: Negative for abdominal pain, constipation and diarrhea. Endocrine: Negative for polydipsia. Genitourinary: Negative for difficulty urinating. Musculoskeletal: Negative for gait problem. Skin: Negative. Neurological: Negative for dizziness. Hematological: Negative. Psychiatric/Behavioral: Negative. Past Medical History:   Diagnosis Date    Anemia     Arthrosis of ankle     SEVERE BILATERALLY     Diverticulosis     Gastric polyps     HTN (hypertension) 3/1/2012    Hyperlipidemia 3/1/2012    OA (osteoarthritis)     Osteopenia     Vitamin D deficiency      Past Surgical History:   Procedure Laterality Date    ANKLE SURGERY  2010    LEFT    CARDIAC CATHETERIZATION  2008    NORMAL    CYSTOSCOPY  07/2017    Dr. Eryn Tello Left 07/2017    left robotic, Dr. Sarai Garcia History     Social History    Marital status:      Spouse name: N/A    Number of children: N/A    Years of education: N/A     Occupational History    Not on file.      Social History Main Topics    Smoking status: Never Smoker    Smokeless tobacco: Never Used    Alcohol use Not on file    Drug use: Unknown    Sexual activity: Not on file     Other Topics Concern    Not on file     Social History record blood pressure and/or food intake. Patient was instructed to keep log up-to-date and to always bring log to all office visits.

## 2018-11-02 NOTE — PROGRESS NOTES
Expiration Date:   11/2/2019     Order Specific Question:   Is Patient Fasting?/# of Hours     Answer:   na     No orders of the defined types were placed in this encounter. There are no discontinued medications. Return in about 6 months (around 5/2/2019). Controlled Substances Monitoring:     No flowsheet data found.     Sofía Jose MD

## 2018-11-14 RX ORDER — ATORVASTATIN CALCIUM 10 MG/1
10 TABLET, FILM COATED ORAL DAILY
Qty: 90 TABLET | Refills: 1 | Status: SHIPPED | OUTPATIENT
Start: 2018-11-14 | End: 2019-03-26 | Stop reason: SDUPTHER

## 2019-01-18 RX ORDER — CELECOXIB 200 MG/1
CAPSULE ORAL
Qty: 90 CAPSULE | Refills: 1 | Status: SHIPPED | OUTPATIENT
Start: 2019-01-18

## 2019-01-18 RX ORDER — AMLODIPINE BESYLATE AND BENAZEPRIL HYDROCHLORIDE 5; 10 MG/1; MG/1
CAPSULE ORAL
Qty: 90 CAPSULE | Refills: 1 | Status: SHIPPED | OUTPATIENT
Start: 2019-01-18

## 2019-01-25 RX ORDER — GABAPENTIN 300 MG/1
CAPSULE ORAL
Qty: 90 CAPSULE | Refills: 0 | Status: SHIPPED | OUTPATIENT
Start: 2019-01-25 | End: 2019-03-24 | Stop reason: SDUPTHER

## 2019-02-21 RX ORDER — IRON POLYSACCHARIDE COMPLEX 150 MG
CAPSULE ORAL
Qty: 180 CAPSULE | Refills: 1 | Status: SHIPPED | OUTPATIENT
Start: 2019-02-21

## 2019-03-04 ENCOUNTER — TELEPHONE (OUTPATIENT)
Dept: FAMILY MEDICINE CLINIC | Age: 74
End: 2019-03-04

## 2019-03-25 RX ORDER — GABAPENTIN 300 MG/1
300 CAPSULE ORAL DAILY
Qty: 90 CAPSULE | Refills: 0 | Status: SHIPPED | OUTPATIENT
Start: 2019-03-25 | End: 2020-05-11

## 2019-03-26 RX ORDER — ATORVASTATIN CALCIUM 10 MG/1
10 TABLET, FILM COATED ORAL DAILY
Qty: 90 TABLET | Refills: 1 | Status: SHIPPED | OUTPATIENT
Start: 2019-03-26

## 2019-05-20 ENCOUNTER — TELEPHONE (OUTPATIENT)
Dept: UROLOGY | Age: 74
End: 2019-05-20

## 2019-05-20 ENCOUNTER — HOSPITAL ENCOUNTER (OUTPATIENT)
Dept: CT IMAGING | Age: 74
Discharge: HOME | End: 2019-05-22
Payer: MEDICARE

## 2019-05-20 DIAGNOSIS — C64.2 RENAL CELL ADENOCARCINOMA, LEFT (HCC): ICD-10-CM

## 2019-05-20 PROCEDURE — 74150 CT ABDOMEN W/O CONTRAST: CPT

## 2019-05-21 ENCOUNTER — TELEPHONE (OUTPATIENT)
Dept: UROLOGY | Age: 74
End: 2019-05-21

## 2019-05-21 NOTE — TELEPHONE ENCOUNTER
----- Message from Boyd Quintero sent at 5/21/2019  9:40 AM EDT -----  Contact: [phone]  Pt is requesting her CT results before her appt, she has to make some personal decisions that these results will effect. Please advise.

## 2019-05-24 ENCOUNTER — OFFICE VISIT (OUTPATIENT)
Dept: UROLOGY | Age: 74
End: 2019-05-24
Payer: MEDICARE

## 2019-05-24 VITALS
OXYGEN SATURATION: 96 % | BODY MASS INDEX: 30.24 KG/M2 | HEIGHT: 59 IN | DIASTOLIC BLOOD PRESSURE: 78 MMHG | WEIGHT: 150 LBS | HEART RATE: 61 BPM | SYSTOLIC BLOOD PRESSURE: 124 MMHG

## 2019-05-24 DIAGNOSIS — C64.2 RENAL CELL ADENOCARCINOMA, LEFT (HCC): Primary | ICD-10-CM

## 2019-05-24 PROCEDURE — 3017F COLORECTAL CA SCREEN DOC REV: CPT | Performed by: UROLOGY

## 2019-05-24 PROCEDURE — G8417 CALC BMI ABV UP PARAM F/U: HCPCS | Performed by: UROLOGY

## 2019-05-24 PROCEDURE — 1036F TOBACCO NON-USER: CPT | Performed by: UROLOGY

## 2019-05-24 PROCEDURE — G8427 DOCREV CUR MEDS BY ELIG CLIN: HCPCS | Performed by: UROLOGY

## 2019-05-24 PROCEDURE — G8399 PT W/DXA RESULTS DOCUMENT: HCPCS | Performed by: UROLOGY

## 2019-05-24 PROCEDURE — 1090F PRES/ABSN URINE INCON ASSESS: CPT | Performed by: UROLOGY

## 2019-05-24 PROCEDURE — 4040F PNEUMOC VAC/ADMIN/RCVD: CPT | Performed by: UROLOGY

## 2019-05-24 PROCEDURE — 1123F ACP DISCUSS/DSCN MKR DOCD: CPT | Performed by: UROLOGY

## 2019-05-24 PROCEDURE — 99213 OFFICE O/P EST LOW 20 MIN: CPT | Performed by: UROLOGY

## 2019-05-24 NOTE — PROGRESS NOTES
[facility] EVALUATION NOTE                                                 H&P                                                                                                                                                 Reason for Visit  Left renal cell carcinoma    History of Present Illness  79-year-old female who underwent a left nephrectomy at [facility]  CT scans since have shown no evidence of recurrent disease or lymphadenopathy  CT scan done recently/2 weeks ago shows no evidence of lymphadenopathy      Urologic Review of Systems/Symptoms  Denies hematuria  Denies dysuria  Denies incontinence  Denies flank pain  Other Urologic: Had a recent UTI treated at a walk-in clinic    Review of Systems  Head and neck: No issues/reviewed  Cardiac: No recent issues/reviewed  Pulmonary: No issues/reviewed  Gastrointestinal: No issues/reviewed  Neurologic: No recent issues/reviewed  Extremities: No issues/reviewed  Lymphatics: No lymphadenopathy no change  Genitourinary: See above  Skin: No issues/reviewed  Hospitalization: None recent  Meds reviewed creatinine is stable  All 14 categories of Review of Systems otherwise reviewed no other findings reported.     Past Medical History:   Diagnosis Date    Anemia     Arthrosis of ankle     SEVERE BILATERALLY     Diverticulosis     Gastric polyps     HTN (hypertension) 3/1/2012    Hyperlipidemia 3/1/2012    OA (osteoarthritis)     Osteopenia     Vitamin D deficiency      Past Surgical History:   Procedure Laterality Date    ANKLE SURGERY  2010    LEFT    CARDIAC CATHETERIZATION  2008    NORMAL    CYSTOSCOPY  07/2017    Dr. Lilibeth Larios Left 07/2017    left robotic, Dr. Shelly Roach History     Socioeconomic History    Marital status:      Spouse name: None    Number of children: None    Years of education: None    Highest education level: None   Occupational History    None   Social  vitamin B-12 (CYANOCOBALAMIN) 500 MCG tablet Take 500 mcg by mouth 2 times daily.  Ascorbic Acid (VITAMIN C) 500 MG tablet Take 500 mg by mouth daily.  Cholecalciferol (VITAMIN D) 2000 UNITS TABS Take  by mouth daily.  Multiple Vitamin (MULTIVITAMIN PO) Take  by mouth daily. No current facility-administered medications for this visit. Percocet [oxycodone-acetaminophen]  All reviewed and verified by Dr Nicole Kruger on today's visit    No results found for: PSA, PSADIA  No results found for this visit on 05/24/19. Physical Exam  Vitals:    05/24/19 1434   BP: 124/78   Pulse: 61   SpO2: 96%   Weight: 150 lb (68 kg)   Height: 4' 11\" (1.499 m)     Constitutional: patient is oriented to person, place, and time. patient appears well-developed. not in distress. Ears: Adequate hearing/no hearing loss  Head: Normocephalic. Atraumatic  Neck: Normal range of motion. Cardiovascular: Normal rate, BP reviewed. normal rhythm  Pulmonary/Chest: Normal respiratory effort  no shortness of breath  Abdominal: Not distended. No abdominal discomfort  Urologic Exam  CT reviewed with patient. Urologic exam otherwise unchanged. .  Musculoskeletal: Normal range of motion. Normal strength. Extremities: No lymphedema   Neurological: Intact no deficits   Skin: Skin is warm and dry. No lesions. No rashes   Psychiatric:  Alert and oriented ×3.   Assessment/Medical Necessity-Decision Making  Status post left robotic nephrectomy for renal cell carcinoma  Follow-up CTs for the past 2 years have shown no evidence of recurrent disease or lymphadenopathy  Plan  Repeat CT of the abdomen 1 year with follow-up  Greater than 50% of 20 minutes spent consulting patient face-to-face  Orders Placed This Encounter   Procedures    CT ABDOMEN WO CONTRAST Additional Contrast? None     Standing Status:   Future     Standing Expiration Date:   5/24/2020     Order Specific Question:   Additional Contrast?     Answer:

## 2020-05-11 ENCOUNTER — VIRTUAL VISIT (OUTPATIENT)
Dept: UROLOGY | Age: 75
End: 2020-05-11
Payer: MEDICARE

## 2020-05-11 ENCOUNTER — HOSPITAL ENCOUNTER (OUTPATIENT)
Dept: CT IMAGING | Age: 75
Discharge: HOME | End: 2020-05-13
Payer: MEDICARE

## 2020-05-11 PROCEDURE — 99441 PR PHYS/QHP TELEPHONE EVALUATION 5-10 MIN: CPT | Performed by: UROLOGY

## 2020-05-11 PROCEDURE — 74150 CT ABDOMEN W/O CONTRAST: CPT

## 2021-05-10 ENCOUNTER — OFFICE VISIT (OUTPATIENT)
Dept: UROLOGY | Age: 76
End: 2021-05-10
Payer: MEDICARE

## 2021-05-10 VITALS
DIASTOLIC BLOOD PRESSURE: 88 MMHG | HEART RATE: 66 BPM | BODY MASS INDEX: 28.83 KG/M2 | WEIGHT: 143 LBS | SYSTOLIC BLOOD PRESSURE: 136 MMHG | HEIGHT: 59 IN

## 2021-05-10 DIAGNOSIS — C64.2 RENAL CELL CARCINOMA OF LEFT KIDNEY (HCC): Primary | ICD-10-CM

## 2021-05-10 PROCEDURE — 4040F PNEUMOC VAC/ADMIN/RCVD: CPT | Performed by: UROLOGY

## 2021-05-10 PROCEDURE — 99213 OFFICE O/P EST LOW 20 MIN: CPT | Performed by: UROLOGY

## 2021-05-10 PROCEDURE — 1036F TOBACCO NON-USER: CPT | Performed by: UROLOGY

## 2021-05-10 PROCEDURE — G8427 DOCREV CUR MEDS BY ELIG CLIN: HCPCS | Performed by: UROLOGY

## 2021-05-10 PROCEDURE — 1090F PRES/ABSN URINE INCON ASSESS: CPT | Performed by: UROLOGY

## 2021-05-10 PROCEDURE — G8417 CALC BMI ABV UP PARAM F/U: HCPCS | Performed by: UROLOGY

## 2021-05-10 PROCEDURE — 1123F ACP DISCUSS/DSCN MKR DOCD: CPT | Performed by: UROLOGY

## 2021-05-10 PROCEDURE — G8399 PT W/DXA RESULTS DOCUMENT: HCPCS | Performed by: UROLOGY

## 2021-05-10 PROCEDURE — 3017F COLORECTAL CA SCREEN DOC REV: CPT | Performed by: UROLOGY

## 2021-05-10 NOTE — PROGRESS NOTES
Days per week: None     Minutes per session: None    Stress: None   Relationships    Social connections     Talks on phone: None     Gets together: None     Attends Yazidi service: None     Active member of club or organization: None     Attends meetings of clubs or organizations: None     Relationship status: None    Intimate partner violence     Fear of current or ex partner: None     Emotionally abused: None     Physically abused: None     Forced sexual activity: None   Other Topics Concern    None   Social History Narrative    None     Family History   Problem Relation Age of Onset    Diabetes Mother     High Blood Pressure Mother     Heart Disease Father     Lupus Sister     Other Brother         TB     Current Outpatient Medications   Medication Sig Dispense Refill    atorvastatin (LIPITOR) 10 MG tablet Take 1 tablet by mouth daily 90 tablet 1    iron polysaccharides (POLY-IRON 150) 150 MG capsule TAKE 1 CAPSULE BY MOUTH TWICE DAILY 180 capsule 1    celecoxib (CELEBREX) 200 MG capsule TAKE 1 CAPSULE BY MOUTH  DAILY 90 capsule 1    amLODIPine-benazepril (LOTREL) 5-10 MG per capsule TAKE 1 CAPSULE BY MOUTH  DAILY 90 capsule 1    ibandronate (BONIVA) 150 MG tablet Take 1 tablet by mouth  every month 3 tablet 1    omeprazole (PRILOSEC) 20 MG delayed release capsule TAKE 1 CAPSULE BY MOUTH  DAILY 90 capsule 3    Calcium Carbonate (CALTRATE 600 PO) Take by mouth daily      aspirin 81 MG tablet Take 81 mg by mouth daily.  vitamin B-12 (CYANOCOBALAMIN) 500 MCG tablet Take 500 mcg by mouth 2 times daily.  Ascorbic Acid (VITAMIN C) 500 MG tablet Take 500 mg by mouth daily.  Cholecalciferol (VITAMIN D) 2000 UNITS TABS Take  by mouth daily.  Multiple Vitamin (MULTIVITAMIN PO) Take  by mouth daily.  gabapentin (NEURONTIN) 300 MG capsule Take 1 capsule by mouth daily for 90 days. 90 capsule 0     No current facility-administered medications for this visit.       Percocet [oxycodone-acetaminophen]  All reviewed and verified by Dr Haile New on today's visit    No results found for: PSA, PSADIA  No results found for this visit on 05/10/21. Physical Exam  Vitals:    05/10/21 1258   BP: 136/88   Pulse: 66   Weight: 143 lb (64.9 kg)   Height: 4' 11\" (1.499 m)     Constitutional: Not in distress  Physical exam otherwise essentially normal  Last CT reviewed. Assessment/Medical Necessity-Decision Making  Status post left renal cell carcinoma  No evidence of recurrent disease previous CTs up to now the past 4 years  Plan  CT without contrast of the abdomen  Notify patient with results and plan on further work-up at that time  Greater than 50% of 20 minutes spent consulting patient face-to-face  Orders Placed This Encounter   Procedures    CT ABDOMEN W WO CONTRAST     Standing Status:   Future     Standing Expiration Date:   5/10/2022     Order Specific Question:   Additional Contrast?     Answer:   None     No orders of the defined types were placed in this encounter. Samantha Hart MD       Please note this report has been partially produced using speech recognition software  And may cause contain errors related to that system including grammar, punctuation and spelling as well as words and phrases that may seem inappropriate. If there are questions or concerns please feel free to contact me to clarify.

## 2021-05-11 ENCOUNTER — HOSPITAL ENCOUNTER (OUTPATIENT)
Dept: CT IMAGING | Age: 76
Discharge: HOME | End: 2021-05-13
Payer: MEDICARE

## 2021-05-11 DIAGNOSIS — C64.2 RENAL CELL CARCINOMA OF LEFT KIDNEY (HCC): ICD-10-CM

## 2021-05-11 PROCEDURE — 74150 CT ABDOMEN W/O CONTRAST: CPT

## 2021-09-09 LAB
SARS-COV-2, PCR: NOT DETECTED
SPECIMEN SOURCE: NORMAL

## 2023-03-10 ENCOUNTER — OFFICE VISIT (OUTPATIENT)
Dept: PRIMARY CARE | Facility: CLINIC | Age: 78
End: 2023-03-10
Payer: MEDICARE

## 2023-03-10 VITALS
RESPIRATION RATE: 16 BRPM | WEIGHT: 159 LBS | SYSTOLIC BLOOD PRESSURE: 126 MMHG | TEMPERATURE: 97.5 F | DIASTOLIC BLOOD PRESSURE: 70 MMHG | OXYGEN SATURATION: 97 % | HEART RATE: 76 BPM | BODY MASS INDEX: 32.05 KG/M2 | HEIGHT: 59 IN

## 2023-03-10 DIAGNOSIS — R39.9 UTI SYMPTOMS: Primary | ICD-10-CM

## 2023-03-10 LAB
POC APPEARANCE, URINE: ABNORMAL
POC BILIRUBIN, URINE: NEGATIVE
POC BLOOD, URINE: ABNORMAL
POC COLOR, URINE: YELLOW
POC GLUCOSE, URINE: NEGATIVE MG/DL
POC KETONES, URINE: ABNORMAL MG/DL
POC LEUKOCYTES, URINE: ABNORMAL
POC NITRITE,URINE: POSITIVE
POC PH, URINE: 6 PH
POC PROTEIN, URINE: ABNORMAL MG/DL
POC SPECIFIC GRAVITY, URINE: 1.02
POC UROBILINOGEN, URINE: 0.2 EU/DL

## 2023-03-10 PROCEDURE — 87186 SC STD MICRODIL/AGAR DIL: CPT

## 2023-03-10 PROCEDURE — 87077 CULTURE AEROBIC IDENTIFY: CPT

## 2023-03-10 PROCEDURE — 99214 OFFICE O/P EST MOD 30 MIN: CPT | Performed by: NURSE PRACTITIONER

## 2023-03-10 PROCEDURE — 87086 URINE CULTURE/COLONY COUNT: CPT

## 2023-03-10 PROCEDURE — 81002 URINALYSIS NONAUTO W/O SCOPE: CPT | Performed by: NURSE PRACTITIONER

## 2023-03-10 RX ORDER — ACETAMINOPHEN 500 MG
50 TABLET ORAL DAILY
COMMUNITY

## 2023-03-10 RX ORDER — GABAPENTIN 300 MG/1
300 CAPSULE ORAL DAILY
COMMUNITY
End: 2023-05-22 | Stop reason: SDUPTHER

## 2023-03-10 RX ORDER — ASCORBIC ACID 500 MG
1 TABLET ORAL DAILY
COMMUNITY

## 2023-03-10 RX ORDER — ACETAMINOPHEN 500 MG
1 TABLET ORAL DAILY
COMMUNITY

## 2023-03-10 RX ORDER — CEPHALEXIN 500 MG/1
500 CAPSULE ORAL 2 TIMES DAILY
Qty: 14 CAPSULE | Refills: 0 | Status: SHIPPED | OUTPATIENT
Start: 2023-03-10 | End: 2023-03-17

## 2023-03-10 RX ORDER — ATORVASTATIN CALCIUM 10 MG/1
10 TABLET, FILM COATED ORAL DAILY
COMMUNITY
End: 2023-07-14 | Stop reason: SDUPTHER

## 2023-03-10 RX ORDER — OMEPRAZOLE 20 MG/1
20 CAPSULE, DELAYED RELEASE ORAL
COMMUNITY
End: 2023-06-01 | Stop reason: SDUPTHER

## 2023-03-10 RX ORDER — MULTIVITAMIN
1 TABLET ORAL DAILY
COMMUNITY

## 2023-03-10 RX ORDER — UBIDECARENONE 75 MG
1 CAPSULE ORAL DAILY
COMMUNITY

## 2023-03-10 RX ORDER — AMLODIPINE AND BENAZEPRIL HYDROCHLORIDE 5; 10 MG/1; MG/1
1 CAPSULE ORAL DAILY
COMMUNITY
End: 2023-04-07 | Stop reason: SDUPTHER

## 2023-03-10 RX ORDER — CALCIUM CARBONATE 600 MG
600 TABLET ORAL 2 TIMES DAILY
COMMUNITY
End: 2023-05-10 | Stop reason: ALTCHOICE

## 2023-03-10 RX ORDER — ALENDRONATE SODIUM 35 MG/1
35 TABLET ORAL
COMMUNITY
End: 2023-03-13

## 2023-03-10 RX ORDER — IRON POLYSACCHARIDE COMPLEX 150 MG
150 CAPSULE ORAL 2 TIMES DAILY
COMMUNITY
End: 2023-08-14 | Stop reason: SDUPTHER

## 2023-03-10 ASSESSMENT — ENCOUNTER SYMPTOMS
DYSURIA: 1
FLANK PAIN: 1

## 2023-03-10 NOTE — PATIENT INSTRUCTIONS
Patient is here today for UTI symptoms     UTI   Start keflex 500mg BID for 7 days   Await urine culture   Increase fluids   Discussed probiotics/ yogurt will taking antibiotics     all questions answered  follow up in office if signs or symptoms are worsening, not improving  or  please schedule follow up with PCP   if shortness of breath and or chest pain seek emergency department   24 hour hotline telephone numbers  Suicide or mental health mobile crisis help line [phone]  Child Abuse or neglect [phone]  Elder Abuse or neglect [phone]  Rape Crisis [phone]  Domestic Violence [phone]  Narcotics Anonymous [phone]

## 2023-03-10 NOTE — PROGRESS NOTES
"Subjective   Patient ID: Jessica Woods is a 77 y.o. female who presents for UTI (Pressure , and burning).    HPI PT states its been 2 days now pt reports burning pressure and pain symptoms have been for a few days pt denies fever or chills pt denies back pain pt has lower abdominal pressure pt always feels like she has to urinate pt has hx of rectocele  sometimes hard to keep things clean        Review of Systems   Genitourinary:  Positive for dysuria, flank pain and pelvic pain.       Objective   /70 (BP Location: Right arm, Patient Position: Sitting, BP Cuff Size: Adult)   Pulse 76   Temp 36.4 °C (97.5 °F)   Resp 16   Ht 1.499 m (4' 11\")   Wt 72.1 kg (159 lb)   SpO2 97%   BMI 32.11 kg/m²     Physical Exam  Constitutional:       Appearance: Normal appearance.   Cardiovascular:      Rate and Rhythm: Normal rate and regular rhythm.   Pulmonary:      Effort: No respiratory distress.   Abdominal:      General: There is no distension.      Tenderness: There is abdominal tenderness.   Genitourinary:     Comments: Pelvic tenderness   Neurological:      Mental Status: She is alert.         Assessment/Plan   Problem List Items Addressed This Visit    None  Visit Diagnoses       UTI symptoms    -  Primary    Relevant Medications    cephalexin (Keflex) 500 mg capsule    Other Relevant Orders    POCT UA (nonautomated) manually resulted (Completed)    Urine Culture               "

## 2023-03-11 DIAGNOSIS — M85.80 OTHER SPECIFIED DISORDERS OF BONE DENSITY AND STRUCTURE, UNSPECIFIED SITE: ICD-10-CM

## 2023-03-13 LAB — URINE CULTURE: ABNORMAL

## 2023-03-13 RX ORDER — ALENDRONATE SODIUM 35 MG/1
TABLET ORAL
Qty: 12 TABLET | Refills: 1 | Status: SHIPPED | OUTPATIENT
Start: 2023-03-13

## 2023-04-07 DIAGNOSIS — I10 HYPERTENSION, UNSPECIFIED TYPE: ICD-10-CM

## 2023-04-11 RX ORDER — AMLODIPINE AND BENAZEPRIL HYDROCHLORIDE 5; 10 MG/1; MG/1
CAPSULE ORAL
Qty: 30 CAPSULE | Refills: 0 | Status: SHIPPED | OUTPATIENT
Start: 2023-04-11 | End: 2023-06-23 | Stop reason: SDUPTHER

## 2023-05-09 PROBLEM — D50.9 IRON DEFICIENCY ANEMIA: Status: ACTIVE | Noted: 2023-05-09

## 2023-05-09 PROBLEM — E78.5 HYPERLIPIDEMIA: Status: ACTIVE | Noted: 2023-05-09

## 2023-05-09 PROBLEM — G62.9 NEUROPATHY: Status: ACTIVE | Noted: 2023-05-09

## 2023-05-09 PROBLEM — G89.29 PAIN, CHRONIC: Status: ACTIVE | Noted: 2023-05-09

## 2023-05-09 PROBLEM — N95.2 ATROPHIC VULVOVAGINITIS: Status: ACTIVE | Noted: 2023-05-09

## 2023-05-09 PROBLEM — M85.80 OSTEOPENIA: Status: ACTIVE | Noted: 2023-05-09

## 2023-05-09 PROBLEM — K21.9 GERD (GASTROESOPHAGEAL REFLUX DISEASE): Status: ACTIVE | Noted: 2023-05-09

## 2023-05-09 PROBLEM — E61.1 LOW IRON: Status: ACTIVE | Noted: 2023-05-09

## 2023-05-09 PROBLEM — I10 HYPERTENSION: Status: ACTIVE | Noted: 2023-05-09

## 2023-05-09 PROBLEM — D17.9 ANGIOMYOLIPOMA: Status: ACTIVE | Noted: 2023-05-09

## 2023-05-09 PROBLEM — C64.2 RENAL CELL CARCINOMA OF LEFT KIDNEY (MULTI): Status: ACTIVE | Noted: 2023-05-09

## 2023-05-10 ENCOUNTER — OFFICE VISIT (OUTPATIENT)
Dept: PRIMARY CARE | Facility: CLINIC | Age: 78
End: 2023-05-10
Payer: MEDICARE

## 2023-05-10 VITALS
OXYGEN SATURATION: 97 % | WEIGHT: 158.5 LBS | HEART RATE: 67 BPM | DIASTOLIC BLOOD PRESSURE: 70 MMHG | TEMPERATURE: 97.9 F | BODY MASS INDEX: 31.95 KG/M2 | SYSTOLIC BLOOD PRESSURE: 120 MMHG | HEIGHT: 59 IN | RESPIRATION RATE: 18 BRPM

## 2023-05-10 DIAGNOSIS — Z12.31 ENCOUNTER FOR SCREENING MAMMOGRAM FOR MALIGNANT NEOPLASM OF BREAST: ICD-10-CM

## 2023-05-10 PROCEDURE — 1159F MED LIST DOCD IN RCRD: CPT | Performed by: FAMILY MEDICINE

## 2023-05-10 PROCEDURE — 3078F DIAST BP <80 MM HG: CPT | Performed by: FAMILY MEDICINE

## 2023-05-10 PROCEDURE — 1036F TOBACCO NON-USER: CPT | Performed by: FAMILY MEDICINE

## 2023-05-10 PROCEDURE — G0439 PPPS, SUBSEQ VISIT: HCPCS | Performed by: FAMILY MEDICINE

## 2023-05-10 PROCEDURE — 1170F FXNL STATUS ASSESSED: CPT | Performed by: FAMILY MEDICINE

## 2023-05-10 PROCEDURE — 3074F SYST BP LT 130 MM HG: CPT | Performed by: FAMILY MEDICINE

## 2023-05-10 RX ORDER — ESTRADIOL 0.1 MG/G
CREAM VAGINAL
COMMUNITY
Start: 2023-04-26

## 2023-05-10 RX ORDER — CELECOXIB 200 MG/1
200 CAPSULE ORAL
COMMUNITY
Start: 2010-08-09 | End: 2023-07-31 | Stop reason: SDUPTHER

## 2023-05-10 ASSESSMENT — ACTIVITIES OF DAILY LIVING (ADL)
GROCERY_SHOPPING: INDEPENDENT
TAKING_MEDICATION: INDEPENDENT
MANAGING_FINANCES: INDEPENDENT
BATHING: INDEPENDENT
DRESSING: INDEPENDENT
DOING_HOUSEWORK: INDEPENDENT

## 2023-05-10 ASSESSMENT — PATIENT HEALTH QUESTIONNAIRE - PHQ9
2. FEELING DOWN, DEPRESSED OR HOPELESS: NOT AT ALL
SUM OF ALL RESPONSES TO PHQ9 QUESTIONS 1 AND 2: 0
1. LITTLE INTEREST OR PLEASURE IN DOING THINGS: NOT AT ALL

## 2023-05-10 NOTE — PROGRESS NOTES
"Subjective   Reason for Visit: Jessica Woods is an 77 y.o. female here for a Medicare Wellness visit.     Past Medical, Surgical, and Family History reviewed and updated in chart.    Reviewed all medications by prescribing practitioner or clinical pharmacist (such as prescriptions, OTCs, herbal therapies and supplements) and documented in the medical record.    HPI  Presents today for above reason    Last eye exam: June 2022  Last dental: January 2023  Pt tries to follow low fat/sugar/salt diet  Exercises: some  Denies SOB/chest pain/palpitations  Pt is not a smoker  No urinary or bowel issues per pt.  Sleeping well  Medications working well    Pt sees Dr. Oshea and was given Estradiol.  Discussed adding fiber to diet.  Pt wants to know if this is okay  Also discuss probiotics    Pt had kidney removed in 2017 d/t cancer  Was seeing Dr. Simpson  Last scan was 4 years ago.  Should she get another now?      Patient Care Team:  Jose Luis Leiva MD as PCP - General  Jose Luis Leiva MD as PCP - [facility] Attributed Provider     Review of Systems    Objective   Vitals:  /70   Pulse 67   Temp 36.6 °C (97.9 °F)   Resp 18   Ht 1.499 m (4' 11\")   Wt 71.9 kg (158 lb 8 oz)   SpO2 97%   BMI 32.01 kg/m²       Physical Exam  Patient here for annual Medicare wellness visit doing well no complaints status post renal cancer in 2017 left nephrectomy was followed up with urology they did not mention they needed to monitor the other kidney she has had no problems renal function has been normal.  Advised if she is concerned we can do a repeat CAT scan at this point she does not want to let us know if she has any other issues.  Overall doing very well.  Physical exam today's office visit constitutional alert and oriented x3.    Head is atraumatic HEENT is within normal limits.    Neck supple no masses full range of motion.    Thyroid is normal in size no thyromegaly there is no carotid bruits.    Pulmonary exam shows " clear to auscultation no respiratory distress.    Cardiovascular shows no murmur rub or gallop.  Regular rate and rhythm.    Abdominal exam soft nontender no hepatosplenomegaly or masses normal bowel sounds no rebound no guarding.    Musculoskeletal exam no joint pain no muscle pain full range of motion.    Psych exam normal mood and affect.    Dermatologic exam no skin lesions no rash no blemishes.    Neuro exam is no focal deficits.  Normal exam.    Extremities no edema normal pulses normal capillary refill.    Assessment/Plan plan is continue current treatment course refill medication she is due for mammogram follow-up when we have the results good we will get routine recheck 6 months blood work at that point.  Problem List Items Addressed This Visit    None  Visit Diagnoses       Encounter for screening mammogram for malignant neoplasm of breast

## 2023-05-22 DIAGNOSIS — G62.9 NEUROPATHY: ICD-10-CM

## 2023-05-22 RX ORDER — GABAPENTIN 300 MG/1
300 CAPSULE ORAL DAILY
Qty: 90 CAPSULE | Refills: 1 | Status: SHIPPED | OUTPATIENT
Start: 2023-05-22 | End: 2023-11-20 | Stop reason: SDUPTHER

## 2023-05-22 NOTE — TELEPHONE ENCOUNTER
Rx Refill Request Telephone Encounter    Name:  Jessica Woods  : 1945     Medication Name:  gabapentin  Dose (Optional):    300mg  Quantity (Optional):    #90  Directions (Optional):   take 1 capsule (300mg) by mouth once daily    ALLERGIES:   see list    Specific Pharmacy location:  [facility]    Date of last appointment:  5/10/23  Date of next appointment:  23    Best number to reach patient:  [phone]

## 2023-06-01 DIAGNOSIS — K21.9 GASTROESOPHAGEAL REFLUX DISEASE, UNSPECIFIED WHETHER ESOPHAGITIS PRESENT: ICD-10-CM

## 2023-06-01 RX ORDER — OMEPRAZOLE 20 MG/1
20 CAPSULE, DELAYED RELEASE ORAL
Qty: 90 CAPSULE | Refills: 2 | Status: SHIPPED | OUTPATIENT
Start: 2023-06-01 | End: 2024-02-20 | Stop reason: SDUPTHER

## 2023-06-01 NOTE — TELEPHONE ENCOUNTER
Rx Refill Request Telephone Encounter    Name:  Jessica Woods  : 1945     Medication Name:  OMEPRAZOLE  Dose (Optional):    20 MG  Quantity (Optional):    90  Directions (Optional):   TAKE 1 DAILY    ALLERGIES:   SEE LIST    Specific Pharmacy location:  [facility]    Date of last appointment:  05/10/2023  Date of next appointment:  2023    Best number to reach patient:  [phone]

## 2023-06-23 DIAGNOSIS — I10 HYPERTENSION, UNSPECIFIED TYPE: ICD-10-CM

## 2023-06-23 RX ORDER — AMLODIPINE AND BENAZEPRIL HYDROCHLORIDE 5; 10 MG/1; MG/1
1 CAPSULE ORAL DAILY
Qty: 90 CAPSULE | Refills: 1 | Status: SHIPPED | OUTPATIENT
Start: 2023-06-23 | End: 2024-01-04 | Stop reason: SDUPTHER

## 2023-06-23 NOTE — TELEPHONE ENCOUNTER
----- Message from Jessica Woods sent at 6/23/2023 11:31 AM EDT -----  Regarding: prescription refill  Contact: [phone]  I need a refill for amLODIPine-benazepriL 5-10 mg capsule  My plan is for 90 pills with 3 refills  My pharmacy is [facility]    Thank you

## 2023-07-14 DIAGNOSIS — E78.2 MIXED HYPERLIPIDEMIA: ICD-10-CM

## 2023-07-14 RX ORDER — ATORVASTATIN CALCIUM 10 MG/1
10 TABLET, FILM COATED ORAL DAILY
Qty: 90 TABLET | Refills: 1 | Status: SHIPPED | OUTPATIENT
Start: 2023-07-14 | End: 2024-02-02 | Stop reason: SDUPTHER

## 2023-07-14 NOTE — TELEPHONE ENCOUNTER
----- Message from Jessica Woods sent at 7/14/2023 11:36 AM EDT -----  Regarding: refill  Contact: [phone]  I need a refill for atorvastatin 10mg tablet  My plan pays for 90 tablets with 3 refills    Thank you  Jessica Woods  [phone]

## 2023-07-31 DIAGNOSIS — G62.9 NEUROPATHY: ICD-10-CM

## 2023-07-31 RX ORDER — CELECOXIB 200 MG/1
200 CAPSULE ORAL
Qty: 270 CAPSULE | Refills: 1 | Status: SHIPPED | OUTPATIENT
Start: 2023-07-31 | End: 2023-08-01 | Stop reason: SDUPTHER

## 2023-07-31 NOTE — TELEPHONE ENCOUNTER
----- Message from Jessica Woods sent at 7/31/2023 12:52 PM EDT -----  Regarding: Refill  Contact: [phone]  I need a refill for Celecoxib 200mg capsules  My plan allows 90 pills with 3 refills    Thank you  Jessica Woods

## 2023-08-01 RX ORDER — CELECOXIB 200 MG/1
200 CAPSULE ORAL DAILY
Qty: 90 CAPSULE | Refills: 1 | Status: SHIPPED | OUTPATIENT
Start: 2023-08-01 | End: 2024-02-02 | Stop reason: SDUPTHER

## 2023-08-01 NOTE — TELEPHONE ENCOUNTER
Pt is calling in regards to the Celebrex 200 mg that was sent in to [facility]. It was sent in for 3 times per day but she only takes it once per day  Please send in Celebrex 200 mg Take 1 capsule daily for a 90 day supply to her pharmacy   Thanks

## 2023-08-02 ENCOUNTER — TELEPHONE (OUTPATIENT)
Dept: PRIMARY CARE | Facility: CLINIC | Age: 78
End: 2023-08-02
Payer: MEDICARE

## 2023-08-02 NOTE — TELEPHONE ENCOUNTER
PRIOR AUTHORIZATION REQUEST     Jessica Woods  1945  [phone]      Medication Name: Celecoxib  Strength: 200mg  QTY: #270  Directions: Take 1 capsule (200mg) by mouth 3 times daily after meals    Insurance Co: Medicare  Insurance ID: [id number]  Insurance PH: [phone]

## 2023-08-03 NOTE — PROGRESS NOTES
"Subjective   Patient ID: Jessica Woods is a 78 y.o. female who presents for Dizziness and Ear Fullness.  HPI  Patient presents today complaining of dizziness x \"years\". States it's waxing, and waning. States the last 2 times it has happened, it has been \"not good\". 2 days ago it happened when she was driving. Thinks it may be Vertigo. States she hasn't tried anything for it. Thinks her right ear is blocked. Has a lot of sinus drainage, and thinks this may be affecting it.    Hasn't taken her Alendronate for the past month. Wondering if the dizziness is from this?      Taking current medications which were reviewed.  Problem list discussed.    Overall doing well.  Eating okay.  Staying active.    Has no other new problem /question.    Review of Systems  Constitutional:  no chills, no fever and no night sweats.  Eyes: no blurred vision and no eyesight problems.  ENT: no hearing loss, no nasal congestion, no hoarseness and no sore throat.  Neck: no mass (es) and no swelling.  Cardiovascular: no chest pain, no intermittent leg claudication, no lower extremity edema, no palpitation and no syncope.  Respiratory: no cough, no shortness of breath during exertion, no shortness of breath at rest and no wheezing.  Gastrointestinal: no abdominal pain, no blood in stools, no constipation, no diarrhea, no melena, no nausea, no rectal pain and no vomiting.  Genitourinary: no dysuria, no change in urinary frequency, no urinary hesitancy and no feelings of urinary urgency.  Musculoskeletal: no arthralgias, no back pain and no myalgias.  Integumentary: no new skin lesions and no rashes.  Neurological: no difficulty walking, no headache, no limb weakness, no numbness and no tingling.  Psychiatric/Behavioral: no anxiety, no depression, no anhedonia and no substance use disorders.  Endocrine: no recent weight gain and no recent weight loss.  Hematologic/Lymphatic: no tendency for easy bruising and no swollen glands    Objective " "  Physical Exam  Patient complains of intermittent vertigo and dizziness being slightly off balance when this happens.  No complaints of blurred vision sinus discomfort or any other problems.  Pupils equal reactive light accommodation extract muscles intact TMs are clear.  No disturbance in lateral gaze.  Physical exam today's office visit constitutional alert and oriented x3.    Head is atraumatic HEENT is within normal limits.    Neck supple no masses full range of motion.    Thyroid is normal in size no thyromegaly there is no carotid bruits.    Pulmonary exam shows clear to auscultation no respiratory distress.    Cardiovascular shows no murmur rub or gallop.  Regular rate and rhythm.    Abdominal exam soft nontender no hepatosplenomegaly or masses normal bowel sounds no rebound no guarding.    Musculoskeletal exam no joint pain no muscle pain full range of motion.    Psych exam normal mood and affect.    Dermatologic exam no skin lesions no rash no blemishes.    Neuro exam is no focal deficits.  Normal exam.    Extremities no edema normal pulses normal capillary refill.    /78   Pulse 86   Temp 36.6 °C (97.9 °F)   Resp 16   Ht 1.499 m (4' 11\")   Wt 72.7 kg (160 lb 3.2 oz)   LMP  (LMP Unknown)   SpO2 93%   BMI 32.36 kg/m²     Lab Results   Component Value Date    WBC 4.1 (L) 11/09/2022    HGB 11.8 (L) 11/09/2022    HCT 37.9 11/09/2022    MCV 91 11/09/2022     11/09/2022       Assessment/Plan   Problem List Items Addressed This Visit          Cardiac and Vasculature    Hypertension       Gastrointestinal and Abdominal    GERD (gastroesophageal reflux disease)       Neuro    Neuropathy     Other Visit Diagnoses       Dizziness    -  Primary    Sensation of fullness in right ear                "

## 2023-08-04 ENCOUNTER — OFFICE VISIT (OUTPATIENT)
Dept: PRIMARY CARE | Facility: CLINIC | Age: 78
End: 2023-08-04
Payer: MEDICARE

## 2023-08-04 VITALS
TEMPERATURE: 97.9 F | OXYGEN SATURATION: 93 % | RESPIRATION RATE: 16 BRPM | DIASTOLIC BLOOD PRESSURE: 78 MMHG | BODY MASS INDEX: 32.3 KG/M2 | HEART RATE: 86 BPM | SYSTOLIC BLOOD PRESSURE: 112 MMHG | HEIGHT: 59 IN | WEIGHT: 160.2 LBS

## 2023-08-04 DIAGNOSIS — K21.9 GASTROESOPHAGEAL REFLUX DISEASE WITHOUT ESOPHAGITIS: ICD-10-CM

## 2023-08-04 DIAGNOSIS — R42 DIZZINESS: Primary | ICD-10-CM

## 2023-08-04 DIAGNOSIS — H93.8X1 SENSATION OF FULLNESS IN RIGHT EAR: ICD-10-CM

## 2023-08-04 DIAGNOSIS — R42 VERTIGO: ICD-10-CM

## 2023-08-04 DIAGNOSIS — I10 HYPERTENSION, UNSPECIFIED TYPE: ICD-10-CM

## 2023-08-04 DIAGNOSIS — G62.9 NEUROPATHY: ICD-10-CM

## 2023-08-04 PROCEDURE — 1159F MED LIST DOCD IN RCRD: CPT | Performed by: FAMILY MEDICINE

## 2023-08-04 PROCEDURE — 3078F DIAST BP <80 MM HG: CPT | Performed by: FAMILY MEDICINE

## 2023-08-04 PROCEDURE — 99213 OFFICE O/P EST LOW 20 MIN: CPT | Performed by: FAMILY MEDICINE

## 2023-08-04 PROCEDURE — 3074F SYST BP LT 130 MM HG: CPT | Performed by: FAMILY MEDICINE

## 2023-08-04 PROCEDURE — 1036F TOBACCO NON-USER: CPT | Performed by: FAMILY MEDICINE

## 2023-08-04 RX ORDER — MECLIZINE HYDROCHLORIDE 25 MG/1
25 TABLET ORAL 3 TIMES DAILY PRN
Qty: 60 TABLET | Refills: 2 | Status: SHIPPED | OUTPATIENT
Start: 2023-08-04

## 2023-08-04 ASSESSMENT — PATIENT HEALTH QUESTIONNAIRE - PHQ9
SUM OF ALL RESPONSES TO PHQ9 QUESTIONS 1 AND 2: 0
2. FEELING DOWN, DEPRESSED OR HOPELESS: NOT AT ALL
1. LITTLE INTEREST OR PLEASURE IN DOING THINGS: NOT AT ALL

## 2023-08-09 NOTE — TELEPHONE ENCOUNTER
PA SUBMITTED VIA COVER MY MEDS.    This medication or product is on your plan's list of covered drugs. Prior authorization is not required at this time. If your pharmacy has questions regarding the processing of your prescription, please have them call the [facility] pharmacy help desk at [phone]

## 2023-08-11 ENCOUNTER — TELEPHONE (OUTPATIENT)
Dept: PRIMARY CARE | Facility: CLINIC | Age: 78
End: 2023-08-11
Payer: MEDICARE

## 2023-08-11 NOTE — TELEPHONE ENCOUNTER
Billing issue from appointment on 5-.  Appointment was coded for awv and patient just had one on 11/9/23.  She was presenting for mammogram follow up and medication refill.

## 2023-08-14 DIAGNOSIS — D50.9 IRON DEFICIENCY ANEMIA, UNSPECIFIED IRON DEFICIENCY ANEMIA TYPE: ICD-10-CM

## 2023-08-14 RX ORDER — IRON POLYSACCHARIDE COMPLEX 150 MG
150 CAPSULE ORAL 2 TIMES DAILY
Qty: 180 CAPSULE | Refills: 0 | Status: SHIPPED | OUTPATIENT
Start: 2023-08-14 | End: 2023-11-20 | Stop reason: SDUPTHER

## 2023-11-08 ENCOUNTER — APPOINTMENT (OUTPATIENT)
Dept: PRIMARY CARE | Facility: CLINIC | Age: 78
End: 2023-11-08
Payer: MEDICARE

## 2023-11-15 ENCOUNTER — APPOINTMENT (OUTPATIENT)
Dept: PRIMARY CARE | Facility: CLINIC | Age: 78
End: 2023-11-15
Payer: MEDICARE

## 2023-11-20 DIAGNOSIS — G62.9 NEUROPATHY: ICD-10-CM

## 2023-11-20 DIAGNOSIS — D50.9 IRON DEFICIENCY ANEMIA, UNSPECIFIED IRON DEFICIENCY ANEMIA TYPE: ICD-10-CM

## 2023-11-20 RX ORDER — IRON POLYSACCHARIDE COMPLEX 150 MG
150 CAPSULE ORAL 2 TIMES DAILY
Qty: 180 CAPSULE | Refills: 0 | Status: SHIPPED | OUTPATIENT
Start: 2023-11-20 | End: 2024-02-20 | Stop reason: SDUPTHER

## 2023-11-20 RX ORDER — GABAPENTIN 300 MG/1
300 CAPSULE ORAL DAILY
Qty: 90 CAPSULE | Refills: 0 | Status: SHIPPED | OUTPATIENT
Start: 2023-11-20 | End: 2024-02-20 | Stop reason: SDUPTHER

## 2023-11-20 NOTE — TELEPHONE ENCOUNTER
----- Message from Jessica Woods sent at 11/20/2023  1:19 PM EST -----  Regarding: refills  Contact: [phone]  I need a refill of iron polysaccharides 150 mg iron capsule and  Gabapentin 300mg capsules  My plan pays for 90 days with refills    Thank you  Jessica Woods

## 2024-01-04 DIAGNOSIS — I10 HYPERTENSION, UNSPECIFIED TYPE: ICD-10-CM

## 2024-01-04 RX ORDER — AMLODIPINE AND BENAZEPRIL HYDROCHLORIDE 5; 10 MG/1; MG/1
1 CAPSULE ORAL DAILY
Qty: 90 CAPSULE | Refills: 0 | Status: SHIPPED | OUTPATIENT
Start: 2024-01-04 | End: 2024-04-03 | Stop reason: SDUPTHER

## 2024-01-04 NOTE — TELEPHONE ENCOUNTER
----- Message from Jessica Woods sent at 1/3/2024 11:25 PM EST -----  Regarding: refill  Contact: [phone]  I need a refill for amLODIPine-benazepriL 5-10 mg capsule My prescription plan pays for 90 pills with 3 refills    Thank you

## 2024-02-02 DIAGNOSIS — G62.9 NEUROPATHY: ICD-10-CM

## 2024-02-02 DIAGNOSIS — E78.2 MIXED HYPERLIPIDEMIA: ICD-10-CM

## 2024-02-02 RX ORDER — ATORVASTATIN CALCIUM 10 MG/1
10 TABLET, FILM COATED ORAL DAILY
Qty: 90 TABLET | Refills: 1 | Status: SHIPPED | OUTPATIENT
Start: 2024-02-02

## 2024-02-02 RX ORDER — CELECOXIB 200 MG/1
200 CAPSULE ORAL DAILY
Qty: 90 CAPSULE | Refills: 1 | Status: SHIPPED | OUTPATIENT
Start: 2024-02-02

## 2024-02-02 NOTE — TELEPHONE ENCOUNTER
----- Message from Jessica Woods sent at 2/1/2024  5:53 PM EST -----  Regarding: Prescription refill  Contact: [phone]  I need refills for  Atorvastatin 10mg  Celecoxib 200mg  my plan covers 90 pills with 3 refills    Thank you

## 2024-02-20 ENCOUNTER — OFFICE VISIT (OUTPATIENT)
Dept: PRIMARY CARE | Facility: CLINIC | Age: 79
End: 2024-02-20
Payer: MEDICARE

## 2024-02-20 ENCOUNTER — LAB (OUTPATIENT)
Dept: LAB | Facility: LAB | Age: 79
End: 2024-02-20
Payer: MEDICARE

## 2024-02-20 VITALS
SYSTOLIC BLOOD PRESSURE: 120 MMHG | OXYGEN SATURATION: 97 % | HEART RATE: 70 BPM | HEIGHT: 59 IN | WEIGHT: 162 LBS | DIASTOLIC BLOOD PRESSURE: 84 MMHG | RESPIRATION RATE: 18 BRPM | BODY MASS INDEX: 32.66 KG/M2

## 2024-02-20 DIAGNOSIS — E78.5 HYPERLIPIDEMIA, UNSPECIFIED HYPERLIPIDEMIA TYPE: ICD-10-CM

## 2024-02-20 DIAGNOSIS — K21.9 GASTROESOPHAGEAL REFLUX DISEASE WITHOUT ESOPHAGITIS: ICD-10-CM

## 2024-02-20 DIAGNOSIS — D50.9 IRON DEFICIENCY ANEMIA, UNSPECIFIED IRON DEFICIENCY ANEMIA TYPE: ICD-10-CM

## 2024-02-20 DIAGNOSIS — E61.1 LOW IRON: ICD-10-CM

## 2024-02-20 DIAGNOSIS — I10 HYPERTENSION, UNSPECIFIED TYPE: ICD-10-CM

## 2024-02-20 DIAGNOSIS — R39.9 UTI SYMPTOMS: ICD-10-CM

## 2024-02-20 DIAGNOSIS — K21.9 GASTROESOPHAGEAL REFLUX DISEASE, UNSPECIFIED WHETHER ESOPHAGITIS PRESENT: ICD-10-CM

## 2024-02-20 DIAGNOSIS — I10 HYPERTENSION, UNSPECIFIED TYPE: Primary | ICD-10-CM

## 2024-02-20 DIAGNOSIS — G62.9 NEUROPATHY: ICD-10-CM

## 2024-02-20 LAB
ALBUMIN SERPL BCP-MCNC: 4.3 G/DL (ref 3.4–5)
ALP SERPL-CCNC: 78 U/L (ref 33–136)
ALT SERPL W P-5'-P-CCNC: 16 U/L (ref 7–45)
ANION GAP SERPL CALC-SCNC: 11 MMOL/L (ref 10–20)
AST SERPL W P-5'-P-CCNC: 23 U/L (ref 9–39)
BASOPHILS # BLD AUTO: 0.04 X10*3/UL (ref 0–0.1)
BASOPHILS NFR BLD AUTO: 0.6 %
BILIRUB SERPL-MCNC: 1 MG/DL (ref 0–1.2)
BUN SERPL-MCNC: 21 MG/DL (ref 6–23)
CALCIUM SERPL-MCNC: 10 MG/DL (ref 8.6–10.3)
CHLORIDE SERPL-SCNC: 104 MMOL/L (ref 98–107)
CHOLEST SERPL-MCNC: 170 MG/DL (ref 0–199)
CHOLESTEROL/HDL RATIO: 2.3
CO2 SERPL-SCNC: 30 MMOL/L (ref 21–32)
CREAT SERPL-MCNC: 1.25 MG/DL (ref 0.5–1.05)
EGFRCR SERPLBLD CKD-EPI 2021: 45 ML/MIN/1.73M*2
EOSINOPHIL # BLD AUTO: 0.24 X10*3/UL (ref 0–0.4)
EOSINOPHIL NFR BLD AUTO: 3.4 %
ERYTHROCYTE [DISTWIDTH] IN BLOOD BY AUTOMATED COUNT: 13.2 % (ref 11.5–14.5)
GLUCOSE SERPL-MCNC: 90 MG/DL (ref 74–99)
HCT VFR BLD AUTO: 38.4 % (ref 36–46)
HDLC SERPL-MCNC: 72.8 MG/DL
HGB BLD-MCNC: 12.4 G/DL (ref 12–16)
IMM GRANULOCYTES # BLD AUTO: 0.02 X10*3/UL (ref 0–0.5)
IMM GRANULOCYTES NFR BLD AUTO: 0.3 % (ref 0–0.9)
IRON SERPL-MCNC: 61 UG/DL (ref 35–150)
LDLC SERPL CALC-MCNC: 80 MG/DL
LYMPHOCYTES # BLD AUTO: 1.31 X10*3/UL (ref 0.8–3)
LYMPHOCYTES NFR BLD AUTO: 18.6 %
MCH RBC QN AUTO: 29.2 PG (ref 26–34)
MCHC RBC AUTO-ENTMCNC: 32.3 G/DL (ref 32–36)
MCV RBC AUTO: 90 FL (ref 80–100)
MONOCYTES # BLD AUTO: 0.58 X10*3/UL (ref 0.05–0.8)
MONOCYTES NFR BLD AUTO: 8.2 %
NEUTROPHILS # BLD AUTO: 4.85 X10*3/UL (ref 1.6–5.5)
NEUTROPHILS NFR BLD AUTO: 68.9 %
NON HDL CHOLESTEROL: 97 MG/DL (ref 0–149)
NRBC BLD-RTO: 0 /100 WBCS (ref 0–0)
PLATELET # BLD AUTO: 213 X10*3/UL (ref 150–450)
POC APPEARANCE, URINE: ABNORMAL
POC BILIRUBIN, URINE: NEGATIVE
POC BLOOD, URINE: ABNORMAL
POC COLOR, URINE: ABNORMAL
POC GLUCOSE, URINE: NEGATIVE MG/DL
POC KETONES, URINE: NEGATIVE MG/DL
POC LEUKOCYTES, URINE: ABNORMAL
POC NITRITE,URINE: NEGATIVE
POC PH, URINE: 6.5 PH
POC PROTEIN, URINE: ABNORMAL MG/DL
POC SPECIFIC GRAVITY, URINE: >=1.03
POC UROBILINOGEN, URINE: 0.2 EU/DL
POTASSIUM SERPL-SCNC: 4.2 MMOL/L (ref 3.5–5.3)
PROT SERPL-MCNC: 6.7 G/DL (ref 6.4–8.2)
RBC # BLD AUTO: 4.25 X10*6/UL (ref 4–5.2)
SODIUM SERPL-SCNC: 141 MMOL/L (ref 136–145)
TRIGL SERPL-MCNC: 87 MG/DL (ref 0–149)
VLDL: 17 MG/DL (ref 0–40)
WBC # BLD AUTO: 7 X10*3/UL (ref 4.4–11.3)

## 2024-02-20 PROCEDURE — 3079F DIAST BP 80-89 MM HG: CPT | Performed by: FAMILY MEDICINE

## 2024-02-20 PROCEDURE — 83540 ASSAY OF IRON: CPT

## 2024-02-20 PROCEDURE — 3074F SYST BP LT 130 MM HG: CPT | Performed by: FAMILY MEDICINE

## 2024-02-20 PROCEDURE — 85025 COMPLETE CBC W/AUTO DIFF WBC: CPT

## 2024-02-20 PROCEDURE — 1036F TOBACCO NON-USER: CPT | Performed by: FAMILY MEDICINE

## 2024-02-20 PROCEDURE — 80061 LIPID PANEL: CPT

## 2024-02-20 PROCEDURE — 81003 URINALYSIS AUTO W/O SCOPE: CPT | Performed by: FAMILY MEDICINE

## 2024-02-20 PROCEDURE — 80053 COMPREHEN METABOLIC PANEL: CPT

## 2024-02-20 PROCEDURE — 99213 OFFICE O/P EST LOW 20 MIN: CPT | Performed by: FAMILY MEDICINE

## 2024-02-20 PROCEDURE — 36415 COLL VENOUS BLD VENIPUNCTURE: CPT

## 2024-02-20 RX ORDER — IRON POLYSACCHARIDE COMPLEX 150 MG
150 CAPSULE ORAL 2 TIMES DAILY
Qty: 180 CAPSULE | Refills: 1 | Status: SHIPPED | OUTPATIENT
Start: 2024-02-20

## 2024-02-20 RX ORDER — SULFAMETHOXAZOLE AND TRIMETHOPRIM 800; 160 MG/1; MG/1
1 TABLET ORAL 2 TIMES DAILY
Qty: 20 TABLET | Refills: 0 | Status: SHIPPED | OUTPATIENT
Start: 2024-02-20 | End: 2024-03-01

## 2024-02-20 RX ORDER — GABAPENTIN 300 MG/1
300 CAPSULE ORAL DAILY
Qty: 90 CAPSULE | Refills: 1 | Status: SHIPPED | OUTPATIENT
Start: 2024-02-20

## 2024-02-20 RX ORDER — OMEPRAZOLE 20 MG/1
20 CAPSULE, DELAYED RELEASE ORAL
Qty: 90 CAPSULE | Refills: 2 | Status: SHIPPED | OUTPATIENT
Start: 2024-02-20

## 2024-02-20 NOTE — PROGRESS NOTES
Subjective   Patient ID: Jessica Woods is a 78 y.o. female who presents for UTI, Hypertension, and Hyperlipidemia.  HPI    HTN and HLD follow up  Denies chest pain,SOB, swelling, headaches, lightheadedness or dizziness.   Eats a generally healthy diet, Exercises.   Does check BP at home.   Currently taking amlodipine-benazepril and atorvastatin     Patient presents in office today for possible UTI  Started Last night  Admits to burning, pressure, and pain.   OTC nothing.     Labs ordered   Mamm done 6/21/23    Review of Systems  Constitutional:  no chills, no fever and no night sweats.  Eyes: no blurred vision and no eyesight problems.  ENT: no hearing loss, no nasal congestion, no hoarseness and no sore throat.  Neck: no mass (es) and no swelling.  Cardiovascular: no chest pain, no intermittent leg claudication, no lower extremity edema, no palpitation and no syncope.  Respiratory: no cough, no shortness of breath during exertion, no shortness of breath at rest and no wheezing.  Gastrointestinal: no abdominal pain, no blood in stools, no constipation, no diarrhea, no melena, no nausea, no rectal pain and no vomiting.  Genitourinary: no dysuria, no change in urinary frequency, no urinary hesitancy and no feelings of urinary urgency.  Musculoskeletal: no arthralgias, no back pain and no myalgias.  Integumentary: no new skin lesions and no rashes.  Neurological: no difficulty walking, no headache, no limb weakness, no numbness and no tingling.  Psychiatric/Behavioral: no anxiety, no depression, no anhedonia and no substance use disorders.  Endocrine: no recent weight gain and no recent weight loss.  Hematologic/Lymphatic: no tendency for easy bruising and no swollen glands    Objective   Physical Exam  Patient in for follow-up hypertension hyperlipidemia yesterday developed some pain and burning with urination little bit worse this morning no blood no fever no chills.  She has had urinary tract infections in the  "past but it has been a while.  Due for blood work today also.  Well-developed well-nourished female in no acute distress physical exam today's office visit constitutional alert and oriented x3.    Head is atraumatic HEENT is within normal limits.    Neck supple no masses full range of motion.    Thyroid is normal in size no thyromegaly there is no carotid bruits.    Pulmonary exam shows clear to auscultation no respiratory distress.    Cardiovascular shows no murmur rub or gallop.  Regular rate and rhythm.    Abdominal exam soft nontender no hepatosplenomegaly or masses normal bowel sounds no rebound no guarding.    Musculoskeletal exam no joint pain no muscle pain full range of motion.    Psych exam normal mood and affect.    Dermatologic exam no skin lesions no rash no blemishes.    Neuro exam is no focal deficits.  Normal exam.    Extremities no edema normal pulses normal capillary refill.  She has mild suprapubic discomfort with palpation mostly pressure no CVA tenderness no lower abdominal pain normal bowel sounds urinalysis shows hematuria and pyuria.  Will send urine for culture.  /84   Pulse 70   Resp 18   Ht 1.499 m (4' 11\")   Wt 73.5 kg (162 lb)   LMP  (LMP Unknown)   SpO2 97%   BMI 32.72 kg/m²     Lab Results   Component Value Date    WBC 4.1 (L) 11/09/2022    HGB 11.8 (L) 11/09/2022    HCT 37.9 11/09/2022    MCV 91 11/09/2022     11/09/2022       Assessment/Plan plan refill routine medications start her on Bactrim DS twice daily for 10 days await culture results get blood drawn follow-up when we have those results.  Problem List Items Addressed This Visit          Cardiac and Vasculature    Hyperlipidemia    Relevant Orders    Lipid Panel    Hypertension - Primary    Relevant Orders    CBC and Auto Differential    Comprehensive Metabolic Panel       Gastrointestinal and Abdominal    GERD (gastroesophageal reflux disease)    Relevant Medications    omeprazole (PriLOSEC) 20 mg " capsule       Hematology and Neoplasia    Iron deficiency anemia    Relevant Medications    iron polysaccharides (Nu-Iron,Niferex) 150 mg iron capsule    Low iron    Relevant Orders    Iron       Neuro    Neuropathy    Relevant Medications    gabapentin (Neurontin) 300 mg capsule     Other Visit Diagnoses       UTI symptoms        Relevant Medications    sulfamethoxazole-trimethoprim (Bactrim DS) 800-160 mg tablet    Other Relevant Orders    POCT UA Automated manually resulted (Completed)

## 2024-02-21 ENCOUNTER — TELEPHONE (OUTPATIENT)
Dept: PRIMARY CARE | Facility: CLINIC | Age: 79
End: 2024-02-21
Payer: MEDICARE

## 2024-03-14 ENCOUNTER — TELEPHONE (OUTPATIENT)
Dept: PRIMARY CARE | Facility: CLINIC | Age: 79
End: 2024-03-14
Payer: MEDICARE

## 2024-03-14 DIAGNOSIS — G89.29 OTHER CHRONIC PAIN: ICD-10-CM

## 2024-04-03 DIAGNOSIS — I10 HYPERTENSION, UNSPECIFIED TYPE: ICD-10-CM

## 2024-04-03 RX ORDER — AMLODIPINE AND BENAZEPRIL HYDROCHLORIDE 5; 10 MG/1; MG/1
1 CAPSULE ORAL DAILY
Qty: 90 CAPSULE | Refills: 1 | Status: SHIPPED | OUTPATIENT
Start: 2024-04-03

## 2024-04-03 NOTE — TELEPHONE ENCOUNTER
----- Message from Jessica Woods sent at 4/3/2024  3:43 PM EDT -----  Regarding: medication refill  Contact: [phone]  I need a refill on Amlodipine-Benazepril 5/10mg  My plan pays for 90 capsules and 3 refills     Thank you  Jessica Woods  
none

## 2024-04-12 ENCOUNTER — OFFICE VISIT (OUTPATIENT)
Dept: PRIMARY CARE | Facility: CLINIC | Age: 79
End: 2024-04-12
Payer: MEDICARE

## 2024-04-12 VITALS
BODY MASS INDEX: 33.34 KG/M2 | RESPIRATION RATE: 16 BRPM | WEIGHT: 165.4 LBS | HEIGHT: 59 IN | DIASTOLIC BLOOD PRESSURE: 87 MMHG | HEART RATE: 68 BPM | OXYGEN SATURATION: 96 % | SYSTOLIC BLOOD PRESSURE: 131 MMHG | TEMPERATURE: 97.5 F

## 2024-04-12 DIAGNOSIS — N39.0 ACUTE LOWER UTI: Primary | ICD-10-CM

## 2024-04-12 DIAGNOSIS — R30.0 DYSURIA: ICD-10-CM

## 2024-04-12 LAB
POC APPEARANCE, URINE: ABNORMAL
POC BILIRUBIN, URINE: NEGATIVE
POC BLOOD, URINE: ABNORMAL
POC COLOR, URINE: YELLOW
POC GLUCOSE, URINE: NEGATIVE MG/DL
POC KETONES, URINE: NEGATIVE MG/DL
POC LEUKOCYTES, URINE: ABNORMAL
POC NITRITE,URINE: POSITIVE
POC PH, URINE: 6 PH
POC PROTEIN, URINE: ABNORMAL MG/DL
POC SPECIFIC GRAVITY, URINE: 1.02
POC UROBILINOGEN, URINE: 0.2 EU/DL

## 2024-04-12 PROCEDURE — 1159F MED LIST DOCD IN RCRD: CPT | Performed by: NURSE PRACTITIONER

## 2024-04-12 PROCEDURE — 1036F TOBACCO NON-USER: CPT | Performed by: NURSE PRACTITIONER

## 2024-04-12 PROCEDURE — 87186 SC STD MICRODIL/AGAR DIL: CPT

## 2024-04-12 PROCEDURE — 1160F RVW MEDS BY RX/DR IN RCRD: CPT | Performed by: NURSE PRACTITIONER

## 2024-04-12 PROCEDURE — 3079F DIAST BP 80-89 MM HG: CPT | Performed by: NURSE PRACTITIONER

## 2024-04-12 PROCEDURE — 99213 OFFICE O/P EST LOW 20 MIN: CPT | Performed by: NURSE PRACTITIONER

## 2024-04-12 PROCEDURE — 87086 URINE CULTURE/COLONY COUNT: CPT

## 2024-04-12 PROCEDURE — 81003 URINALYSIS AUTO W/O SCOPE: CPT | Performed by: NURSE PRACTITIONER

## 2024-04-12 PROCEDURE — 3075F SYST BP GE 130 - 139MM HG: CPT | Performed by: NURSE PRACTITIONER

## 2024-04-12 RX ORDER — SULFAMETHOXAZOLE AND TRIMETHOPRIM 800; 160 MG/1; MG/1
1 TABLET ORAL 2 TIMES DAILY
Qty: 20 TABLET | Refills: 0 | Status: SHIPPED | OUTPATIENT
Start: 2024-04-12 | End: 2024-04-22

## 2024-04-12 ASSESSMENT — ENCOUNTER SYMPTOMS
COUGH: 0
PALPITATIONS: 0
FEVER: 0
VOMITING: 0
FLANK PAIN: 0
CONSTIPATION: 0
SHORTNESS OF BREATH: 0
CHILLS: 0
ABDOMINAL PAIN: 0
DYSURIA: 1
DIARRHEA: 0
BACK PAIN: 0
FREQUENCY: 1

## 2024-04-12 NOTE — PROGRESS NOTES
"Subjective   Patient ID: Jessica Woods is a 78 y.o. female who presents for UTI.    Symptoms: urgency, frequency, burning.  Length of symptoms: yesterday  OTC: none      HPI     78-year-old female presents today complaining of dysuria, increased urinary urgency and frequency that started yesterday.  She denies any abdominal pain or back pain.  She denies any fever or chills.  She does state that she was treated for a UTI in February, 2024.  She does state that she is experiencing looser stools.  She does try to practice good hygiene, proper wiping but states that it is hard and she believes that is what is contributing to her frequent UTIs.    Review of Systems   Constitutional:  Negative for chills and fever.   Respiratory:  Negative for cough and shortness of breath.    Cardiovascular:  Negative for chest pain and palpitations.   Gastrointestinal:  Negative for abdominal pain, constipation, diarrhea and vomiting.   Genitourinary:  Positive for dysuria, frequency and urgency. Negative for flank pain.   Musculoskeletal:  Negative for back pain.       Objective   /87 Comment: Auto  Pulse 68   Temp 36.4 °C (97.5 °F)   Resp 16   Ht 1.499 m (4' 11\")   Wt 75 kg (165 lb 6.4 oz)   LMP  (LMP Unknown)   SpO2 96%   BMI 33.41 kg/m²     Physical Exam  Vitals and nursing note reviewed.   Constitutional:       General: She is not in acute distress.     Appearance: Normal appearance.   Cardiovascular:      Rate and Rhythm: Normal rate and regular rhythm.      Heart sounds: Normal heart sounds.   Pulmonary:      Effort: Pulmonary effort is normal.      Breath sounds: Normal breath sounds. No wheezing, rhonchi or rales.   Abdominal:      General: Bowel sounds are normal.      Palpations: Abdomen is soft.      Tenderness: There is no abdominal tenderness. There is no right CVA tenderness or left CVA tenderness.   Musculoskeletal:      Cervical back: Neck supple.   Skin:     General: Skin is warm and dry. "   Neurological:      Mental Status: She is alert.   Psychiatric:         Mood and Affect: Mood normal.         Behavior: Behavior normal.         Assessment/Plan   Problem List Items Addressed This Visit    None  Visit Diagnoses         Codes    Acute lower UTI    -  Primary N39.0    Relevant Medications    sulfamethoxazole-trimethoprim (Bactrim DS) 800-160 mg tablet    Dysuria     R30.0    BMI 33.0-33.9,adult     Z68.33        UTI: UA positive for blood, protein, nitrite and leukocytes.  Will treat with Bactrim, urine culture pending.  Increase rest and fluids.  Follow-up with PCP with any persisting symptoms.  If any new or worsening symptoms, advised to proceed to emergency department.

## 2024-04-12 NOTE — PATIENT INSTRUCTIONS
Today you were seen in the [facility] for Urinary symptoms.   Urine dipstick revealed:  large blood, protein, nitrite and leukocytes.   A urine culture will be sent and you will be notified of any changes to your current therapy.   A prescription for Bactrim was sent to your pharmacy. Please take this medication as prescribed and until completion.   Increase your daily consumption of water. Avoid caffeine, alcohol and citrus as they can be irritating to the urinary tract. May also add cranberry juice to daily regimen (sugar free)  Practice good hygiene (wiping front to back). Void more frequently throughout the day.   Follow up with your Primary Care Physician within 5 days or as needed  If any new or worsening symptoms, please proceed to emergency department for further evaluation.

## 2024-04-15 LAB
BACTERIA UR CULT: ABNORMAL
BACTERIA UR CULT: ABNORMAL

## 2024-07-01 DIAGNOSIS — M85.80 OTHER SPECIFIED DISORDERS OF BONE DENSITY AND STRUCTURE, UNSPECIFIED SITE: ICD-10-CM

## 2024-07-01 DIAGNOSIS — I10 HYPERTENSION, UNSPECIFIED TYPE: ICD-10-CM

## 2024-07-01 RX ORDER — AMLODIPINE AND BENAZEPRIL HYDROCHLORIDE 5; 10 MG/1; MG/1
1 CAPSULE ORAL DAILY
Qty: 90 CAPSULE | Refills: 1 | Status: SHIPPED | OUTPATIENT
Start: 2024-07-01

## 2024-07-01 RX ORDER — ALENDRONATE SODIUM 35 MG/1
35 TABLET ORAL
Qty: 12 TABLET | Refills: 1 | Status: SHIPPED | OUTPATIENT
Start: 2024-07-01

## 2024-07-01 NOTE — TELEPHONE ENCOUNTER
----- Message from Jessica Woods sent at 6/30/2024  2:11 PM EDT -----  Regarding: refills  Contact: [phone]  I need refills for  amLODIPine-benazepriL 5-10 mg capsule  and  alendronate 35 mg tablet    Thank you

## 2024-07-31 DIAGNOSIS — G62.9 NEUROPATHY: ICD-10-CM

## 2024-07-31 DIAGNOSIS — E78.2 MIXED HYPERLIPIDEMIA: ICD-10-CM

## 2024-07-31 RX ORDER — ATORVASTATIN CALCIUM 10 MG/1
10 TABLET, FILM COATED ORAL DAILY
Qty: 90 TABLET | Refills: 1 | Status: SHIPPED | OUTPATIENT
Start: 2024-07-31

## 2024-07-31 RX ORDER — CELECOXIB 200 MG/1
200 CAPSULE ORAL DAILY
Qty: 90 CAPSULE | Refills: 1 | Status: SHIPPED | OUTPATIENT
Start: 2024-07-31

## 2024-07-31 RX ORDER — GABAPENTIN 300 MG/1
300 CAPSULE ORAL DAILY
Qty: 90 CAPSULE | Refills: 1 | Status: SHIPPED | OUTPATIENT
Start: 2024-07-31

## 2024-07-31 NOTE — TELEPHONE ENCOUNTER
Medications pended     Refills  (Newest Message First)  View All Conversations on this Encounter  Jessica [id number] [address] Clinical Support Staff (supporting Jose Luis Leiva MD)16 hours ago (4:54 PM)       I need refills for the following medications     Celecoxib 200mg capsules  Atorvastatin 10mg tablet  Gabapentin 300mg capsule     Thank you

## 2024-08-20 ENCOUNTER — APPOINTMENT (OUTPATIENT)
Dept: PRIMARY CARE | Facility: CLINIC | Age: 79
End: 2024-08-20
Payer: MEDICARE

## 2024-08-20 ENCOUNTER — LAB (OUTPATIENT)
Dept: LAB | Facility: LAB | Age: 79
End: 2024-08-20
Payer: MEDICARE

## 2024-08-20 VITALS
HEART RATE: 62 BPM | SYSTOLIC BLOOD PRESSURE: 126 MMHG | BODY MASS INDEX: 32.7 KG/M2 | TEMPERATURE: 97.3 F | DIASTOLIC BLOOD PRESSURE: 80 MMHG | OXYGEN SATURATION: 96 % | WEIGHT: 162.2 LBS | HEIGHT: 59 IN

## 2024-08-20 DIAGNOSIS — M85.80 OSTEOPENIA, UNSPECIFIED LOCATION: ICD-10-CM

## 2024-08-20 DIAGNOSIS — N28.9 RENAL INSUFFICIENCY: ICD-10-CM

## 2024-08-20 DIAGNOSIS — Z00.00 MEDICARE ANNUAL WELLNESS VISIT, SUBSEQUENT: Primary | ICD-10-CM

## 2024-08-20 DIAGNOSIS — E78.5 HYPERLIPIDEMIA, UNSPECIFIED HYPERLIPIDEMIA TYPE: ICD-10-CM

## 2024-08-20 DIAGNOSIS — Z12.31 ENCOUNTER FOR SCREENING MAMMOGRAM FOR MALIGNANT NEOPLASM OF BREAST: ICD-10-CM

## 2024-08-20 DIAGNOSIS — E66.09 CLASS 1 OBESITY DUE TO EXCESS CALORIES WITH SERIOUS COMORBIDITY AND BODY MASS INDEX (BMI) OF 32.0 TO 32.9 IN ADULT: ICD-10-CM

## 2024-08-20 DIAGNOSIS — R25.2 LEG CRAMPS: ICD-10-CM

## 2024-08-20 DIAGNOSIS — M85.9 DISORDER OF BONE DENSITY AND STRUCTURE, UNSPECIFIED: ICD-10-CM

## 2024-08-20 DIAGNOSIS — M81.0 OSTEOPOROSIS: ICD-10-CM

## 2024-08-20 DIAGNOSIS — I10 HYPERTENSION, UNSPECIFIED TYPE: ICD-10-CM

## 2024-08-20 LAB
ANION GAP SERPL CALC-SCNC: 13 MMOL/L (ref 10–20)
BUN SERPL-MCNC: 18 MG/DL (ref 6–23)
CALCIUM SERPL-MCNC: 10 MG/DL (ref 8.6–10.6)
CHLORIDE SERPL-SCNC: 105 MMOL/L (ref 98–107)
CO2 SERPL-SCNC: 30 MMOL/L (ref 21–32)
CREAT SERPL-MCNC: 1.25 MG/DL (ref 0.5–1.05)
EGFRCR SERPLBLD CKD-EPI 2021: 44 ML/MIN/1.73M*2
GLUCOSE SERPL-MCNC: 90 MG/DL (ref 74–99)
POTASSIUM SERPL-SCNC: 4.3 MMOL/L (ref 3.5–5.3)
SODIUM SERPL-SCNC: 144 MMOL/L (ref 136–145)

## 2024-08-20 PROCEDURE — 36415 COLL VENOUS BLD VENIPUNCTURE: CPT

## 2024-08-20 PROCEDURE — 1170F FXNL STATUS ASSESSED: CPT | Performed by: FAMILY MEDICINE

## 2024-08-20 PROCEDURE — 1036F TOBACCO NON-USER: CPT | Performed by: FAMILY MEDICINE

## 2024-08-20 PROCEDURE — 3079F DIAST BP 80-89 MM HG: CPT | Performed by: FAMILY MEDICINE

## 2024-08-20 PROCEDURE — 3074F SYST BP LT 130 MM HG: CPT | Performed by: FAMILY MEDICINE

## 2024-08-20 PROCEDURE — G0439 PPPS, SUBSEQ VISIT: HCPCS | Performed by: FAMILY MEDICINE

## 2024-08-20 PROCEDURE — 1159F MED LIST DOCD IN RCRD: CPT | Performed by: FAMILY MEDICINE

## 2024-08-20 PROCEDURE — 1123F ACP DISCUSS/DSCN MKR DOCD: CPT | Performed by: FAMILY MEDICINE

## 2024-08-20 PROCEDURE — 80048 BASIC METABOLIC PNL TOTAL CA: CPT

## 2024-08-20 ASSESSMENT — ACTIVITIES OF DAILY LIVING (ADL)
TAKING_MEDICATION: INDEPENDENT
BATHING: INDEPENDENT
MANAGING_FINANCES: INDEPENDENT
DRESSING: INDEPENDENT
GROCERY_SHOPPING: INDEPENDENT
DOING_HOUSEWORK: INDEPENDENT

## 2024-08-20 ASSESSMENT — PATIENT HEALTH QUESTIONNAIRE - PHQ9
SUM OF ALL RESPONSES TO PHQ9 QUESTIONS 1 AND 2: 0
1. LITTLE INTEREST OR PLEASURE IN DOING THINGS: NOT AT ALL
2. FEELING DOWN, DEPRESSED OR HOPELESS: NOT AT ALL

## 2024-08-20 NOTE — PROGRESS NOTES
Subjective   Patient ID: Jessica Woods is a 79 y.o. female who presents for Medicare Annual Wellness Visit Subsequent, Hypertension, Hyperlipidemia, and Leg Cramps.  HPI    Patient presents in the office today for a Medicare Annual Wellness Visit.     Patient presents today for a follow-up on Hypertension and Hyperlipidemia. Tries to follow a low sodium, and low fat diet. Does not check BP at home. Exercises irregularly. Blood work 2/20/24.    Patient states she has been on her feet a lot lately and now is getting cramps in her calves. Patient would like advice on what to take for these.     Advanced Care Planning  Jessica Woods and I discussed their advance care plan preferences such as living will, and durable health care power of , which include: yes Attempt Resuscitation, yes Intubate & Ventilate, yes Comfort Care or Life- Sustaning Care. I reminded patient to talk with their health care agent, Romina Phan, about their health care goals. Note reflects patient's free will and care goals as expressed to me.         Review of Systems  Constitutional:  no chills, no fever and no night sweats.  Eyes: no blurred vision and no eyesight problems.  ENT: no hearing loss, no nasal congestion, no hoarseness and no sore throat.  Neck: no mass (es) and no swelling.  Cardiovascular: no chest pain, no intermittent leg claudication, no lower extremity edema, no palpitation and no syncope.  Respiratory: no cough, no shortness of breath during exertion, no shortness of breath at rest and no wheezing.  Gastrointestinal: no abdominal pain, no blood in stools, no constipation, no diarrhea, no melena, no nausea, no rectal pain and no vomiting.  Genitourinary: no dysuria, no change in urinary frequency, no urinary hesitancy and no feelings of urinary urgency.  Musculoskeletal: no arthralgias, no back pain and no myalgias.  Integumentary: no new skin lesions and no rashes.  Neurological: no difficulty walking, no  "headache, no limb weakness, no numbness and no tingling.  Psychiatric/Behavioral: no anxiety, no depression, no anhedonia and no substance use disorders.  Endocrine: no recent weight gain and no recent weight loss.  Hematologic/Lymphatic: no tendency for easy bruising and no swollen glands    Objective   Physical Exam  Patient in today for annual Medicare wellness exam history of hypertension hyperlipidemia mild GERD all stable.  Is on her feet more recently has been having some calf muscle cramping that we will start a part-time job next week as a  working 4 to 6-hour shifts 3 to 4 days a week she states since her  passed she had been bored at home she wants to get out of the house lungs clear cardiac and abdominal exams are benign physical exam today's office visit constitutional alert and oriented x3.    Head is atraumatic HEENT is within normal limits.    Neck supple no masses full range of motion.    Thyroid is normal in size no thyromegaly there is no carotid bruits.    Pulmonary exam shows clear to auscultation no respiratory distress.    Cardiovascular shows no murmur rub or gallop.  Regular rate and rhythm.    Abdominal exam soft nontender no hepatosplenomegaly or masses normal bowel sounds no rebound no guarding.    Musculoskeletal exam no joint pain no muscle pain full range of motion.    Psych exam normal mood and affect.    Dermatologic exam no skin lesions no rash no blemishes.    Neuro exam is no focal deficits.  Normal exam.    Extremities no edema normal pulses normal capillary refill.    /80 (BP Location: Left arm, Patient Position: Sitting)   Pulse 62   Temp 36.3 °C (97.3 °F) (Temporal)   Ht 1.499 m (4' 11\")   Wt 73.6 kg (162 lb 3.2 oz)   LMP  (LMP Unknown)   SpO2 96%   BMI 32.76 kg/m²     Lab Results   Component Value Date    WBC 7.0 02/20/2024    HGB 12.4 02/20/2024    HCT 38.4 02/20/2024    MCV 90 02/20/2024     02/20/2024       Assessment/Plan plan get her " scheduled for bone density test in mammogram recheck BMP due to borderline renal function status post left nephrectomy in May 2021 for renal cell carcinoma 4 mm fatty tumor on the right kidney no need for further workup according to radiology and the urologist.  Follow-up when we have the test results.  Doing well routine recheck 6 months  Problem List Items Addressed This Visit          Cardiac and Vasculature    Hyperlipidemia    Hypertension       Musculoskeletal and Injuries    Osteopenia     Other Visit Diagnoses       Medicare annual wellness visit, subsequent    -  Primary    BMI 32.0-32.9,adult        Class 1 obesity due to excess calories with serious comorbidity and body mass index (BMI) of 32.0 to 32.9 in adult        Encounter for screening mammogram for malignant neoplasm of breast        Relevant Orders    BI mammo bilateral screening tomosynthesis    Disorder of bone density and structure, unspecified        Relevant Orders    XR DEXA bone density    Leg cramps        Osteoporosis        Relevant Orders    XR DEXA bone density    Renal insufficiency        Relevant Orders    Basic Metabolic Panel        Medicare wellness questionnaire reviewed in detail. Advanced Directives reviewed today, Importance of having Advance care planing discussed. Patient advised to provide the office with a copy if has not already done so. No problems with activities of daily living. Falls risks reviewed.

## 2024-08-21 ENCOUNTER — TELEPHONE (OUTPATIENT)
Dept: PRIMARY CARE | Facility: CLINIC | Age: 79
End: 2024-08-21
Payer: MEDICARE

## 2024-08-21 NOTE — TELEPHONE ENCOUNTER
----- Message from Jose Luis Leiva sent at 8/20/2024  5:40 PM EDT -----  Renal function stable recheck in 6 months

## 2024-08-27 DIAGNOSIS — D50.9 IRON DEFICIENCY ANEMIA, UNSPECIFIED IRON DEFICIENCY ANEMIA TYPE: ICD-10-CM

## 2024-08-27 RX ORDER — IRON POLYSACCHARIDE COMPLEX 150 MG
150 CAPSULE ORAL 2 TIMES DAILY
Qty: 180 CAPSULE | Refills: 1 | Status: SHIPPED | OUTPATIENT
Start: 2024-08-27

## 2024-08-27 NOTE — TELEPHONE ENCOUNTER
Last seen 8/20/24  Medication pended        Jessica [id number] [facility] Clinical Support Staff (supporting Jose Luis Leiva MD)3 hours ago (3:10 AM)       I need a refill for  iron polysaccharides 150 mg iron capsule  my plan pays for a 90 day supply  Thank you

## 2024-09-06 ENCOUNTER — HOSPITAL ENCOUNTER (OUTPATIENT)
Dept: RADIOLOGY | Facility: CLINIC | Age: 79
Discharge: HOME | End: 2024-09-06
Payer: MEDICARE

## 2024-09-06 VITALS — BODY MASS INDEX: 32.66 KG/M2 | WEIGHT: 162 LBS | HEIGHT: 59 IN

## 2024-09-06 DIAGNOSIS — Z12.31 ENCOUNTER FOR SCREENING MAMMOGRAM FOR MALIGNANT NEOPLASM OF BREAST: ICD-10-CM

## 2024-09-06 DIAGNOSIS — M81.0 OSTEOPOROSIS: ICD-10-CM

## 2024-09-06 DIAGNOSIS — M85.9 DISORDER OF BONE DENSITY AND STRUCTURE, UNSPECIFIED: ICD-10-CM

## 2024-09-06 PROCEDURE — 77067 SCR MAMMO BI INCL CAD: CPT

## 2024-09-06 PROCEDURE — 77080 DXA BONE DENSITY AXIAL: CPT

## 2024-09-06 ASSESSMENT — LIFESTYLE VARIABLES
3_OR_MORE_DRINKS_PER_DAY: N
CURRENT_SMOKER: N

## 2024-09-17 ENCOUNTER — TELEPHONE (OUTPATIENT)
Dept: PRIMARY CARE | Facility: CLINIC | Age: 79
End: 2024-09-17
Payer: MEDICARE

## 2024-09-17 NOTE — TELEPHONE ENCOUNTER
----- Message from Jose Luis Leiva sent at 9/17/2024 11:57 AM EDT -----  Normal mammogram repeat in 1 year

## 2024-09-17 NOTE — TELEPHONE ENCOUNTER
----- Message from Jose Luis Leiva sent at 9/17/2024 12:40 PM EDT -----  Bone density shows normal lumbar spine.  Osteoporosis of hip.  Plan is to continue the Fosamax.

## 2024-10-14 DIAGNOSIS — I10 HYPERTENSION, UNSPECIFIED TYPE: ICD-10-CM

## 2024-10-14 RX ORDER — AMLODIPINE AND BENAZEPRIL HYDROCHLORIDE 5; 10 MG/1; MG/1
1 CAPSULE ORAL DAILY
Qty: 90 CAPSULE | Refills: 3 | Status: SHIPPED | OUTPATIENT
Start: 2024-10-14

## 2024-10-14 NOTE — TELEPHONE ENCOUNTER
MEDICATION PENDED     Jessica Woods [id number] [address] Clinical Support Staff  Phone Number: [phone]     I need a refill for amLODIPine-benazepriL 5-10 mg capsule  My plan pays for 90 pills with 3 refills  Thank you

## 2024-11-04 ENCOUNTER — OFFICE VISIT (OUTPATIENT)
Dept: PRIMARY CARE | Facility: CLINIC | Age: 79
End: 2024-11-04
Payer: MEDICARE

## 2024-11-04 VITALS
BODY MASS INDEX: 32.46 KG/M2 | HEART RATE: 63 BPM | OXYGEN SATURATION: 98 % | TEMPERATURE: 97.6 F | HEIGHT: 59 IN | WEIGHT: 161 LBS | DIASTOLIC BLOOD PRESSURE: 78 MMHG | SYSTOLIC BLOOD PRESSURE: 144 MMHG

## 2024-11-04 DIAGNOSIS — I10 HYPERTENSION, UNSPECIFIED TYPE: ICD-10-CM

## 2024-11-04 DIAGNOSIS — J40 BRONCHITIS: Primary | ICD-10-CM

## 2024-11-04 DIAGNOSIS — R42 VERTIGO: ICD-10-CM

## 2024-11-04 DIAGNOSIS — E78.5 HYPERLIPIDEMIA, UNSPECIFIED HYPERLIPIDEMIA TYPE: ICD-10-CM

## 2024-11-04 PROCEDURE — 1123F ACP DISCUSS/DSCN MKR DOCD: CPT | Performed by: FAMILY MEDICINE

## 2024-11-04 PROCEDURE — 1159F MED LIST DOCD IN RCRD: CPT | Performed by: FAMILY MEDICINE

## 2024-11-04 PROCEDURE — 3078F DIAST BP <80 MM HG: CPT | Performed by: FAMILY MEDICINE

## 2024-11-04 PROCEDURE — 3077F SYST BP >= 140 MM HG: CPT | Performed by: FAMILY MEDICINE

## 2024-11-04 PROCEDURE — 99213 OFFICE O/P EST LOW 20 MIN: CPT | Performed by: FAMILY MEDICINE

## 2024-11-04 RX ORDER — BENZONATATE 200 MG/1
200 CAPSULE ORAL 3 TIMES DAILY PRN
Qty: 42 CAPSULE | Refills: 0 | Status: SHIPPED | OUTPATIENT
Start: 2024-11-04 | End: 2024-12-04

## 2024-11-04 RX ORDER — AZITHROMYCIN 250 MG/1
TABLET, FILM COATED ORAL
Qty: 6 TABLET | Refills: 0 | Status: SHIPPED | OUTPATIENT
Start: 2024-11-04 | End: 2024-11-08

## 2024-11-04 ASSESSMENT — PATIENT HEALTH QUESTIONNAIRE - PHQ9
1. LITTLE INTEREST OR PLEASURE IN DOING THINGS: NOT AT ALL
2. FEELING DOWN, DEPRESSED OR HOPELESS: NOT AT ALL
SUM OF ALL RESPONSES TO PHQ9 QUESTIONS 1 AND 2: 0

## 2024-11-04 NOTE — PROGRESS NOTES
Subjective   Patient ID: Jessica Woods is a 79 y.o. female who presents for No chief complaint on file..  HPI  Patient presents in the office today with complaints of having a cold. Stats she has congestion, a cough and fatigue. Patient states it will go away then come back worse. Tested for covid and came back neg. Ongoing X 1 week. Has tried otc meds.  Review of Systems  Constitutional:  no chills, no fever and no night sweats.  Eyes: no blurred vision and no eyesight problems.  ENT: no hearing loss, no nasal congestion, no hoarseness and no sore throat.  Neck: no mass (es) and no swelling.  Cardiovascular: no chest pain, no intermittent leg claudication, no lower extremity edema, no palpitation and no syncope.  Respiratory: no cough, no shortness of breath during exertion, no shortness of breath at rest and no wheezing.  Gastrointestinal: no abdominal pain, no blood in stools, no constipation, no diarrhea, no melena, no nausea, no rectal pain and no vomiting.  Genitourinary: no dysuria, no change in urinary frequency, no urinary hesitancy and no feelings of urinary urgency.  Musculoskeletal: no arthralgias, no back pain and no myalgias.  Integumentary: no new skin lesions and no rashes.  Neurological: no difficulty walking, no headache, no limb weakness, no numbness and no tingling.  Psychiatric/Behavioral: no anxiety, no depression, no anhedonia and no substance use disorders.  Endocrine: no recent weight gain and no recent weight loss.  Hematologic/Lymphatic: no tendency for easy bruising and no swollen glands    Objective   Physical Exam  Patient here today with complaints of sinus pressure congestion occasionally productive cough.  Denies fever and chills no chest pain or shortness of breath.  Elderly obese female in no acute distress she has maxillary sinus discomfort and frontal sinus fullness.  TMs are clear occasional popping and clicking mild eustachian tube dysfunction pharynx inflamed adenopathy  without exudate neck is supple lungs are clear no wheeze rhonchi crackles or retractions cardiac and abdominal exams are benign.  LMP  (LMP Unknown)     Lab Results   Component Value Date    WBC 7.0 02/20/2024    HGB 12.4 02/20/2024    HCT 38.4 02/20/2024    MCV 90 02/20/2024     02/20/2024       Assessment/Plan assessment sinusitis plan is start her on Z-Jose and give her Tessalon Perles for cough she can continue to use over-the-counter cough medications in addition.  If she is not improving or new symptoms develop she will let us know.  Otherwise due for health maintenance exam after the first of the year.  Problem List Items Addressed This Visit    None

## 2024-11-15 ENCOUNTER — OFFICE VISIT (OUTPATIENT)
Dept: PRIMARY CARE | Facility: CLINIC | Age: 79
End: 2024-11-15
Payer: MEDICARE

## 2024-11-15 VITALS
DIASTOLIC BLOOD PRESSURE: 87 MMHG | HEIGHT: 59 IN | TEMPERATURE: 98.3 F | OXYGEN SATURATION: 95 % | BODY MASS INDEX: 32.34 KG/M2 | RESPIRATION RATE: 16 BRPM | WEIGHT: 160.4 LBS | HEART RATE: 66 BPM | SYSTOLIC BLOOD PRESSURE: 152 MMHG

## 2024-11-15 DIAGNOSIS — R39.15 URGENCY OF URINATION: ICD-10-CM

## 2024-11-15 DIAGNOSIS — R30.0 BURNING WITH URINATION: ICD-10-CM

## 2024-11-15 DIAGNOSIS — R39.9 SYMPTOMS OF URINARY TRACT INFECTION: Primary | ICD-10-CM

## 2024-11-15 DIAGNOSIS — E66.811 CLASS 1 OBESITY WITH SERIOUS COMORBIDITY AND BODY MASS INDEX (BMI) OF 32.0 TO 32.9 IN ADULT, UNSPECIFIED OBESITY TYPE: ICD-10-CM

## 2024-11-15 LAB
POC APPEARANCE, URINE: ABNORMAL
POC BILIRUBIN, URINE: NEGATIVE
POC BLOOD, URINE: ABNORMAL
POC COLOR, URINE: YELLOW
POC GLUCOSE, URINE: NEGATIVE MG/DL
POC KETONES, URINE: NEGATIVE MG/DL
POC LEUKOCYTES, URINE: ABNORMAL
POC NITRITE,URINE: POSITIVE
POC PH, URINE: 6 PH
POC PROTEIN, URINE: ABNORMAL MG/DL
POC SPECIFIC GRAVITY, URINE: >=1.03
POC UROBILINOGEN, URINE: 0.2 EU/DL

## 2024-11-15 PROCEDURE — 87186 SC STD MICRODIL/AGAR DIL: CPT

## 2024-11-15 PROCEDURE — 99214 OFFICE O/P EST MOD 30 MIN: CPT | Performed by: NURSE PRACTITIONER

## 2024-11-15 PROCEDURE — 87086 URINE CULTURE/COLONY COUNT: CPT

## 2024-11-15 PROCEDURE — 81003 URINALYSIS AUTO W/O SCOPE: CPT | Performed by: NURSE PRACTITIONER

## 2024-11-15 RX ORDER — SULFAMETHOXAZOLE AND TRIMETHOPRIM 800; 160 MG/1; MG/1
1 TABLET ORAL 2 TIMES DAILY
Qty: 14 TABLET | Refills: 0 | Status: SHIPPED | OUTPATIENT
Start: 2024-11-15 | End: 2024-11-22

## 2024-11-15 ASSESSMENT — PATIENT HEALTH QUESTIONNAIRE - PHQ9
SUM OF ALL RESPONSES TO PHQ9 QUESTIONS 1 AND 2: 0
2. FEELING DOWN, DEPRESSED OR HOPELESS: NOT AT ALL
SUM OF ALL RESPONSES TO PHQ9 QUESTIONS 1 AND 2: 0
2. FEELING DOWN, DEPRESSED OR HOPELESS: NOT AT ALL
1. LITTLE INTEREST OR PLEASURE IN DOING THINGS: NOT AT ALL
1. LITTLE INTEREST OR PLEASURE IN DOING THINGS: NOT AT ALL

## 2024-11-15 ASSESSMENT — ENCOUNTER SYMPTOMS
OCCASIONAL FEELINGS OF UNSTEADINESS: 0
LOSS OF SENSATION IN FEET: 0
DEPRESSION: 0

## 2024-11-15 NOTE — PROGRESS NOTES
Subjective   Patient ID: Jessica Woods is a 79 y.o. female who is with complaint of symptoms of UTI.    HPI  Patient is a 79 y.o. female who CONSULTED AT [facility] today. Patient is with complaints of burning sensation on urination, increased urinary frequency, urgency of urination, mild sensation of inadequate emptying post voiding, lower abdominal discomfort (dysuria), and nocturia. She denies having any low back pain, flank pain, incontinence, cloudy urine, blood in urine, nausea, vomiting, chills, nor fever. Patient states symptoms has been going on since yesterday. Patient has not taken any medication for relief of symptoms.     Review of Systems  General: no weight loss, generally healthy, no fatigue  Head:  no headaches / sinus pain, no vertigo, no injury  Eyes: no diplopia, no tearing, no pain,   Ears: no change in hearing, no tinnitus, no bleeding, no vertigo  Mouth:  no dental difficulties, no gingival bleeding, no sore throat, no loss of sense of taste  Nose: no congestion, no  discharge, no bleeding, no obstruction, no loss of sense of smell  Neck: no stiffness, no pain, no tenderness, no masses, no bruit  Pulmonary: no dyspnea, no wheezing, no hemoptysis, no cough  Cardiovascular: no chest pain, no palpitations, no syncope, no orthopnea  Gastrointestinal: no change in appetite, no dysphagia, no abdominal pains, no diarrhea, no emesis, no melena  Genito Urinary: (+) burning sensation on urination, (+) increased urinary frequency, (+) urgency of urination, (+) mild sensation of inadequate emptying post voiding, (+) lower abdominal discomfort (dysuria), (+) nocturia, no low back pain, no flank pain, no incontinence, no cloudy urine, no blood in urine,   Musculoskeletal: no muscle ache, no joint pain, no limitation of range of motion, no paresthesia, no numbness  Constitutional: no fever, no chills, no night sweats    Objective   Physical Exam  General: ambulatory, in no acute  distress  Head: normocephalic, no lesions  Eyes: pink palpebral conjunctiva, anicteric sclerae, PERRLA, EOM's full  Abdomen: flat, NABS, soft, no direct tenderness, no rebound tenderness, no mass palpated, SIGNS: no Mobile, no Rovsings, no Psoas, no Obturator; No CVA tenderness,    Assessment/Plan   Problem List Items Addressed This Visit    None  Visit Diagnoses         Codes    Symptoms of urinary tract infection    -  Primary R39.9    Relevant Medications    sulfamethoxazole-trimethoprim (Bactrim DS) 800-160 mg tablet    Other Relevant Orders    Urine Culture    POCT UA Automated manually resulted (Completed)    Burning with urination     R30.0    Relevant Medications    sulfamethoxazole-trimethoprim (Bactrim DS) 800-160 mg tablet    Other Relevant Orders    Urine Culture    POCT UA Automated manually resulted (Completed)    Urgency of urination     R39.15    Relevant Medications    sulfamethoxazole-trimethoprim (Bactrim DS) 800-160 mg tablet    Other Relevant Orders    Urine Culture    POCT UA Automated manually resulted (Completed)    BMI 32.0-32.9,adult     Z68.32    Class 1 obesity with serious comorbidity and body mass index (BMI) of 32.0 to 32.9 in adult, unspecified obesity type     E66.811, Z68.32        Urinalysis was done at the office today. Urinalysis result explained and discussed with patient. Urine sample submitted to laboratory for culture and sensitivity study. The laboratory examination requested were explained and discussed with patient.    DISCHARGE SUMMARY:   Patient seen and examined. Probable diagnosis, differential diagnosis, treatment, treatment options, and probable complications were discussed and explained to patient. she was to take medication/s associated with this visit. she may take over-the-counter pain and/or fever medication if needed. Advised increased oral fluid intake (2 liters of water or more per day). Reinforced to continue personal hygiene. Patient to return to clinic if  there is worsening or persistence of symptoms. Patient verbalized understanding.    Patient to come back in 7 - 10 days if needed for worsening symptoms.           VALENTIN Knight-CNP 11/15/24 5:02 PM

## 2024-11-15 NOTE — PROGRESS NOTES
"Subjective   Patient ID: Jessica Woods is a 79 y.o. female who presents for UTI      .Symptoms: urgency, burning and frequency  Length of symptoms: yesterday  OTC: none  Related information:      HPI     Review of Systems    Objective   /87 Comment: auto  Pulse 66   Temp 36.8 °C (98.3 °F)   Resp 16   Ht 1.499 m (4' 11\")   Wt 72.8 kg (160 lb 6.4 oz)   LMP  (LMP Unknown)   SpO2 95%   BMI 32.40 kg/m²     Physical Exam    Assessment/Plan          "

## 2024-11-17 LAB — BACTERIA UR CULT: ABNORMAL

## 2024-11-18 ENCOUNTER — DOCUMENTATION (OUTPATIENT)
Dept: PRIMARY CARE | Facility: CLINIC | Age: 79
End: 2024-11-18
Payer: MEDICARE

## 2024-11-18 LAB — BACTERIA UR CULT: ABNORMAL

## 2024-11-29 ENCOUNTER — PATIENT MESSAGE (OUTPATIENT)
Dept: PRIMARY CARE | Facility: CLINIC | Age: 79
End: 2024-11-29
Payer: MEDICARE

## 2024-11-29 DIAGNOSIS — K21.9 GASTROESOPHAGEAL REFLUX DISEASE, UNSPECIFIED WHETHER ESOPHAGITIS PRESENT: ICD-10-CM

## 2024-11-29 RX ORDER — OMEPRAZOLE 20 MG/1
20 CAPSULE, DELAYED RELEASE ORAL
Qty: 90 CAPSULE | Refills: 3 | Status: SHIPPED | OUTPATIENT
Start: 2024-11-29

## 2025-02-11 DIAGNOSIS — G62.9 NEUROPATHY: ICD-10-CM

## 2025-02-11 DIAGNOSIS — E78.2 MIXED HYPERLIPIDEMIA: ICD-10-CM

## 2025-02-11 RX ORDER — GABAPENTIN 300 MG/1
300 CAPSULE ORAL DAILY
Qty: 90 CAPSULE | Refills: 0 | Status: SHIPPED | OUTPATIENT
Start: 2025-02-11

## 2025-02-11 RX ORDER — ATORVASTATIN CALCIUM 10 MG/1
10 TABLET, FILM COATED ORAL DAILY
Qty: 90 TABLET | Refills: 0 | Status: SHIPPED | OUTPATIENT
Start: 2025-02-11

## 2025-02-11 RX ORDER — CELECOXIB 200 MG/1
200 CAPSULE ORAL DAILY
Qty: 90 CAPSULE | Refills: 0 | Status: SHIPPED | OUTPATIENT
Start: 2025-02-11

## 2025-02-11 NOTE — TELEPHONE ENCOUNTER
Last seen 11/4/24  Medication pended      Jessica [id number] [facility] Clinical Support Staff (supporting Jose Luis Leiva MD)5 hours ago (6:44 AM)       I need refills of 90 pills with 3 refills for the following  celecoxib 200mg capsules  gabapentin 300mg capsule  atorvastatin 10mg  prescriptions go to [facility]     Thank you

## 2025-02-20 ENCOUNTER — APPOINTMENT (OUTPATIENT)
Dept: PRIMARY CARE | Facility: CLINIC | Age: 80
End: 2025-02-20
Payer: MEDICARE

## 2025-02-20 VITALS
OXYGEN SATURATION: 99 % | DIASTOLIC BLOOD PRESSURE: 80 MMHG | HEART RATE: 57 BPM | BODY MASS INDEX: 32.05 KG/M2 | HEIGHT: 59 IN | TEMPERATURE: 97.6 F | WEIGHT: 159 LBS | SYSTOLIC BLOOD PRESSURE: 144 MMHG

## 2025-02-20 DIAGNOSIS — E61.1 LOW IRON: ICD-10-CM

## 2025-02-20 DIAGNOSIS — M85.80 OSTEOPENIA, UNSPECIFIED LOCATION: ICD-10-CM

## 2025-02-20 DIAGNOSIS — K21.9 GASTROESOPHAGEAL REFLUX DISEASE, UNSPECIFIED WHETHER ESOPHAGITIS PRESENT: ICD-10-CM

## 2025-02-20 DIAGNOSIS — E78.5 HYPERLIPIDEMIA, UNSPECIFIED HYPERLIPIDEMIA TYPE: ICD-10-CM

## 2025-02-20 DIAGNOSIS — I10 HYPERTENSION, UNSPECIFIED TYPE: ICD-10-CM

## 2025-02-20 PROCEDURE — 1159F MED LIST DOCD IN RCRD: CPT | Performed by: FAMILY MEDICINE

## 2025-02-20 PROCEDURE — 99213 OFFICE O/P EST LOW 20 MIN: CPT | Performed by: FAMILY MEDICINE

## 2025-02-20 PROCEDURE — 3079F DIAST BP 80-89 MM HG: CPT | Performed by: FAMILY MEDICINE

## 2025-02-20 PROCEDURE — 1123F ACP DISCUSS/DSCN MKR DOCD: CPT | Performed by: FAMILY MEDICINE

## 2025-02-20 PROCEDURE — 3077F SYST BP >= 140 MM HG: CPT | Performed by: FAMILY MEDICINE

## 2025-02-20 PROCEDURE — 1036F TOBACCO NON-USER: CPT | Performed by: FAMILY MEDICINE

## 2025-02-20 NOTE — PROGRESS NOTES
Subjective   Patient ID: Jessica Woods is a 79 y.o. female who presents for No chief complaint on file..  HPI  Patient presents today for a follow-up on Hypertension, and Hyperlipidemia. Pt does not follow a low sugar, low sodium, and low fat diet. Pt does not check sugar and/or BP at home. Exercises never. Blood work signed.    Patient would like a renewed handicap placard.     Patient states she is having lower back ain on the LT side. Patient had kidney cancer and had it removed. Patient says this could be from working and standing more.       Review of Systems  Constitutional:  no chills, no fever and no night sweats.  Eyes: no blurred vision and no eyesight problems.  ENT: no hearing loss, no nasal congestion, no hoarseness and no sore throat.  Neck: no mass (es) and no swelling.  Cardiovascular: no chest pain, no intermittent leg claudication, no lower extremity edema, no palpitation and no syncope.  Respiratory: no cough, no shortness of breath during exertion, no shortness of breath at rest and no wheezing.  Gastrointestinal: no abdominal pain, no blood in stools, no constipation, no diarrhea, no melena, no nausea, no rectal pain and no vomiting.  Genitourinary: no dysuria, no change in urinary frequency, no urinary hesitancy and no feelings of urinary urgency.  Musculoskeletal: no arthralgias, no back pain and no myalgias.  Integumentary: no new skin lesions and no rashes.  Neurological: no difficulty walking, no headache, no limb weakness, no numbness and no tingling.  Psychiatric/Behavioral: no anxiety, no depression, no anhedonia and no substance use disorders.  Endocrine: no recent weight gain and no recent weight loss.  Hematologic/Lymphatic: no tendency for easy bruising and no swollen glands    Objective   Physical Exam  Patient here for follow-up hypertension hyperlipidemia intermittent low back pain mild exacerbation.  She is still working as a  she is on her feet all day at work.   Recommend when she comes home heat versus ice first thing in the morning getting out of bed heat hot shower versus heating pad work on gentle stretching and let us know if is not improving.  Overall she has no new complaints well-developed well-nourished obese elderly female in no acute distress physical exam today's office visit constitutional alert and oriented x3.    Head is atraumatic HEENT is within normal limits.    Neck supple no masses full range of motion.    Thyroid is normal in size no thyromegaly there is no carotid bruits.    Pulmonary exam shows clear to auscultation no respiratory distress.    Cardiovascular shows no murmur rub or gallop.  Regular rate and rhythm.    Abdominal exam soft nontender no hepatosplenomegaly or masses normal bowel sounds no rebound no guarding.    Musculoskeletal exam no joint pain no muscle pain full range of motion.    Psych exam normal mood and affect.    Dermatologic exam no skin lesions no rash no blemishes.    Neuro exam is no focal deficits.  Normal exam.    Extremities no edema normal pulses normal capillary refill.    LMP  (LMP Unknown)     Lab Results   Component Value Date    WBC 7.0 02/20/2024    HGB 12.4 02/20/2024    HCT 38.4 02/20/2024    MCV 90 02/20/2024     02/20/2024       Assessment/Plan plan is get blood drawn follow-up we have the results refill her medication as necessary routine recheck 6 months  Problem List Items Addressed This Visit    None

## 2025-02-21 ASSESSMENT — DERMATOLOGY QUALITY OF LIFE (QOL) ASSESSMENT
DATE THE QUALITY-OF-LIFE ASSESSMENT WAS COMPLETED: 67257
RATE HOW BOTHERED YOU ARE BY SYMPTOMS OF YOUR SKIN PROBLEM (EG, ITCHING, STINGING BURNING, HURTING OR SKIN IRRITATION): 0 - NEVER BOTHERED
WHAT SINGLE SKIN CONDITION LISTED BELOW IS THE PATIENT ANSWERING THE QUALITY-OF-LIFE ASSESSMENT QUESTIONS ABOUT: NONE OF THE ABOVE
RATE HOW BOTHERED YOU ARE BY EFFECTS OF YOUR SKIN PROBLEMS ON YOUR ACTIVITIES (EG, GOING OUT, ACCOMPLISHING WHAT YOU WANT, WORK ACTIVITIES OR YOUR RELATIONSHIPS WITH OTHERS): 0 - NEVER BOTHERED
RATE HOW EMOTIONALLY BOTHERED YOU ARE BY YOUR SKIN PROBLEM (FOR EXAMPLE, WORRY, EMBARRASSMENT, FRUSTRATION): 4
RATE HOW BOTHERED YOU ARE BY EFFECTS OF YOUR SKIN PROBLEMS ON YOUR ACTIVITIES (EG, GOING OUT, ACCOMPLISHING WHAT YOU WANT, WORK ACTIVITIES OR YOUR RELATIONSHIPS WITH OTHERS): 0 - NEVER BOTHERED
RATE HOW BOTHERED YOU ARE BY SYMPTOMS OF YOUR SKIN PROBLEM (EG, ITCHING, STINGING BURNING, HURTING OR SKIN IRRITATION): 0 - NEVER BOTHERED
WHAT SINGLE SKIN CONDITION LISTED BELOW IS THE PATIENT ANSWERING THE QUALITY-OF-LIFE ASSESSMENT QUESTIONS ABOUT: NONE OF THE ABOVE
RATE HOW EMOTIONALLY BOTHERED YOU ARE BY YOUR SKIN PROBLEM (FOR EXAMPLE, WORRY, EMBARRASSMENT, FRUSTRATION): 4

## 2025-02-22 LAB
ALBUMIN SERPL-MCNC: 4.5 G/DL (ref 3.6–5.1)
ALP SERPL-CCNC: 72 U/L (ref 37–153)
ALT SERPL-CCNC: 16 U/L (ref 6–29)
ANION GAP SERPL CALCULATED.4IONS-SCNC: 8 MMOL/L (CALC) (ref 7–17)
AST SERPL-CCNC: 26 U/L (ref 10–35)
BASOPHILS # BLD AUTO: 50 CELLS/UL (ref 0–200)
BASOPHILS NFR BLD AUTO: 1.2 %
BILIRUB SERPL-MCNC: 1.2 MG/DL (ref 0.2–1.2)
BUN SERPL-MCNC: 19 MG/DL (ref 7–25)
CALCIUM SERPL-MCNC: 9.9 MG/DL (ref 8.6–10.4)
CHLORIDE SERPL-SCNC: 105 MMOL/L (ref 98–110)
CHOLEST SERPL-MCNC: 174 MG/DL
CHOLEST/HDLC SERPL: 1.9 (CALC)
CO2 SERPL-SCNC: 30 MMOL/L (ref 20–32)
CREAT SERPL-MCNC: 1.13 MG/DL (ref 0.6–1)
EGFRCR SERPLBLD CKD-EPI 2021: 50 ML/MIN/1.73M2
EOSINOPHIL # BLD AUTO: 239 CELLS/UL (ref 15–500)
EOSINOPHIL NFR BLD AUTO: 5.7 %
ERYTHROCYTE [DISTWIDTH] IN BLOOD BY AUTOMATED COUNT: 12.2 % (ref 11–15)
GLUCOSE SERPL-MCNC: 94 MG/DL (ref 65–99)
HCT VFR BLD AUTO: 38.5 % (ref 35–45)
HDLC SERPL-MCNC: 94 MG/DL
HGB BLD-MCNC: 12.5 G/DL (ref 11.7–15.5)
IRON SERPL-MCNC: 67 MCG/DL (ref 45–160)
LDLC SERPL CALC-MCNC: 68 MG/DL (CALC)
LYMPHOCYTES # BLD AUTO: 1214 CELLS/UL (ref 850–3900)
LYMPHOCYTES NFR BLD AUTO: 28.9 %
MCH RBC QN AUTO: 28.9 PG (ref 27–33)
MCHC RBC AUTO-ENTMCNC: 32.5 G/DL (ref 32–36)
MCV RBC AUTO: 89.1 FL (ref 80–100)
MONOCYTES # BLD AUTO: 420 CELLS/UL (ref 200–950)
MONOCYTES NFR BLD AUTO: 10 %
NEUTROPHILS # BLD AUTO: 2276 CELLS/UL (ref 1500–7800)
NEUTROPHILS NFR BLD AUTO: 54.2 %
NONHDLC SERPL-MCNC: 80 MG/DL (CALC)
PLATELET # BLD AUTO: 246 THOUSAND/UL (ref 140–400)
PMV BLD REES-ECKER: 11.7 FL (ref 7.5–12.5)
POTASSIUM SERPL-SCNC: 4.4 MMOL/L (ref 3.5–5.3)
PROT SERPL-MCNC: 6.5 G/DL (ref 6.1–8.1)
RBC # BLD AUTO: 4.32 MILLION/UL (ref 3.8–5.1)
SODIUM SERPL-SCNC: 143 MMOL/L (ref 135–146)
TRIGL SERPL-MCNC: 50 MG/DL
WBC # BLD AUTO: 4.2 THOUSAND/UL (ref 3.8–10.8)

## 2025-02-24 ENCOUNTER — TELEPHONE (OUTPATIENT)
Dept: PRIMARY CARE | Facility: CLINIC | Age: 80
End: 2025-02-24
Payer: MEDICARE

## 2025-02-24 NOTE — TELEPHONE ENCOUNTER
----- Message from Jose Luis Leiva sent at 2/24/2025  7:57 AM EST -----  Labs stable.  Recheck in 6 months

## 2025-02-26 ENCOUNTER — APPOINTMENT (OUTPATIENT)
Dept: DERMATOLOGY | Facility: CLINIC | Age: 80
End: 2025-02-26
Payer: MEDICARE

## 2025-02-26 DIAGNOSIS — R23.8 VENOUS LAKE OF LIP: Primary | ICD-10-CM

## 2025-02-26 PROCEDURE — 99202 OFFICE O/P NEW SF 15 MIN: CPT | Performed by: NURSE PRACTITIONER

## 2025-02-26 PROCEDURE — 1123F ACP DISCUSS/DSCN MKR DOCD: CPT | Performed by: NURSE PRACTITIONER

## 2025-02-26 PROCEDURE — 1159F MED LIST DOCD IN RCRD: CPT | Performed by: NURSE PRACTITIONER

## 2025-02-26 PROCEDURE — 1036F TOBACCO NON-USER: CPT | Performed by: NURSE PRACTITIONER

## 2025-02-26 NOTE — PROGRESS NOTES
Subjective     Jessica Woods is a 79 y.o. female who presents for the following: single lesion.   New patient in for single lesion to  left lower lip present for years, patient states a few weeks ago the area became more raised and then following a root canal the area flattened.     Review of Systems:  No other skin or systemic complaints other than what is documented elsewhere in the note.    The following portions of the chart were reviewed this encounter and updated as appropriate:       Skin Cancer History  No skin cancer on file.    Specialty Problems    None    Past Medical History:  Jessica Woods  has a past medical history of Acute recurrent pansinusitis (03/25/2020), Acute sinusitis, unspecified (03/25/2020), Allergic, Anemia, Arthritis, Cancer (Multi), Cataract, Encounter for immunization (11/07/2019), GERD (gastroesophageal reflux disease), Hypertension, Other specified disorders of kidney and ureter (11/11/2019), Personal history of other diseases of the musculoskeletal system and connective tissue (03/25/2020), Personal history of other diseases of the respiratory system (08/24/2020), Personal history of other drug therapy (11/07/2019), Personal history of other medical treatment (11/07/2019), Personal history of other specified conditions (03/25/2020), and Urinary tract infection.    Past Surgical History:  Jessica Woods  has a past surgical history that includes Other surgical history (06/21/2017); Cataract extraction (06/21/2017); Hysterectomy (06/21/2017); Ankle surgery (06/21/2017); and Cardiac catheterization.    Family History:  Patient family history includes Alcohol abuse in her father; Arthritis in her mother; Diabetes in her mother and another family member; Heart disease in her father; Hypertension in her mother; Miscarriages / Stillbirths in her mother; Multiple sclerosis in her sister; Stroke in her mother; systemic lupus in her sister.    Social History:  Jessica Woods   reports that she has never smoked. She has never used smokeless tobacco. She reports that she does not drink alcohol and does not use drugs.    Allergies:  Oxycodone-acetaminophen    Current Medications / CAM's:    Current Outpatient Medications:     alendronate (Fosamax) 35 mg tablet, Take 1 tablet (35 mg) by mouth 1 (one) time per week. Take in the morning with a full glass of water, on an empty stomach, and do not take anything else by mouth or lie down for the next 30 min., Disp: 12 tablet, Rfl: 1    amLODIPine-benazepriL (Lotrel) 5-10 mg capsule, Take 1 capsule by mouth once daily., Disp: 90 capsule, Rfl: 3    ascorbic acid (Vitamin C) 500 mg tablet, Take 1 tablet (500 mg) by mouth once daily., Disp: , Rfl:     aspirin-calcium carbonate 81 mg-300 mg calcium(777 mg) tablet, Take 1 tablet by mouth once daily., Disp: , Rfl:     atorvastatin (Lipitor) 10 mg tablet, Take 1 tablet (10 mg) by mouth once daily., Disp: 90 tablet, Rfl: 0    calcium carbonate-vitamin D3 600 mg-20 mcg (800 unit) tablet, Take 1 tablet by mouth once daily., Disp: , Rfl:     celecoxib (CeleBREX) 200 mg capsule, Take 1 capsule (200 mg) by mouth once daily., Disp: 90 capsule, Rfl: 0    cholecalciferol (Vitamin D-3) 50 mcg (2,000 unit) capsule, Take 1 capsule (50 mcg) by mouth early in the morning.., Disp: , Rfl:     cyanocobalamin (Vitamin B-12) 500 mcg tablet, Take 1 tablet (500 mcg) by mouth once daily., Disp: , Rfl:     estradiol (Estrace) 0.01 % (0.1 mg/gram) vaginal cream, Insert into the vagina., Disp: , Rfl:     gabapentin (Neurontin) 300 mg capsule, Take 1 capsule (300 mg) by mouth once daily., Disp: 90 capsule, Rfl: 0    iron polysaccharides (Nu-Iron,Niferex) 150 mg iron capsule, Take 1 capsule (150 mg) by mouth 2 times a day., Disp: 180 capsule, Rfl: 1    meclizine (Antivert) 25 mg tablet, Take 1 tablet (25 mg) by mouth 3 times a day as needed for dizziness., Disp: 60 tablet, Rfl: 2    multivitamin tablet, Take 1 tablet by mouth once  daily., Disp: , Rfl:     omeprazole (PriLOSEC) 20 mg DR capsule, Take 1 capsule (20 mg) by mouth once daily in the morning. Take before meals., Disp: 90 capsule, Rfl: 3     Objective   Well appearing patient in no apparent distress; mood and affect are within normal limits.      Assessment/Plan   1. Venous lake of lip  Left Lower Vermilion Lip  Violaceous macule    -Discussed nature of condition  -Reassurance, recommend continued observation

## 2025-03-13 DIAGNOSIS — D50.9 IRON DEFICIENCY ANEMIA, UNSPECIFIED IRON DEFICIENCY ANEMIA TYPE: ICD-10-CM

## 2025-03-13 RX ORDER — IRON POLYSACCHARIDE COMPLEX 150 MG
150 CAPSULE ORAL 2 TIMES DAILY
Qty: 180 CAPSULE | Refills: 1 | Status: SHIPPED | OUTPATIENT
Start: 2025-03-13

## 2025-03-13 NOTE — TELEPHONE ENCOUNTER
Last seen 2/20/25  Medication pended      Jessica [id number] [facility] Clinical Support Staff (supporting Jose Luis Leiva MD)8 hours ago (11:04 PM)       I need a refill for  iron polysaccharides 150 mg iron capsule     Thank you  Jessica Woods

## 2025-04-02 ENCOUNTER — OFFICE VISIT (OUTPATIENT)
Dept: PRIMARY CARE | Facility: CLINIC | Age: 80
End: 2025-04-02
Payer: MEDICARE

## 2025-04-02 VITALS
TEMPERATURE: 97 F | HEIGHT: 59 IN | DIASTOLIC BLOOD PRESSURE: 78 MMHG | WEIGHT: 161.2 LBS | BODY MASS INDEX: 32.5 KG/M2 | HEART RATE: 70 BPM | OXYGEN SATURATION: 98 % | SYSTOLIC BLOOD PRESSURE: 132 MMHG

## 2025-04-02 DIAGNOSIS — J01.00 ACUTE NON-RECURRENT MAXILLARY SINUSITIS: Primary | ICD-10-CM

## 2025-04-02 DIAGNOSIS — E66.811 CLASS 1 OBESITY WITH SERIOUS COMORBIDITY AND BODY MASS INDEX (BMI) OF 32.0 TO 32.9 IN ADULT, UNSPECIFIED OBESITY TYPE: ICD-10-CM

## 2025-04-02 PROCEDURE — 3075F SYST BP GE 130 - 139MM HG: CPT | Performed by: NURSE PRACTITIONER

## 2025-04-02 PROCEDURE — 3078F DIAST BP <80 MM HG: CPT | Performed by: NURSE PRACTITIONER

## 2025-04-02 PROCEDURE — 1159F MED LIST DOCD IN RCRD: CPT | Performed by: NURSE PRACTITIONER

## 2025-04-02 PROCEDURE — 99213 OFFICE O/P EST LOW 20 MIN: CPT | Performed by: NURSE PRACTITIONER

## 2025-04-02 PROCEDURE — 1160F RVW MEDS BY RX/DR IN RCRD: CPT | Performed by: NURSE PRACTITIONER

## 2025-04-02 PROCEDURE — 1123F ACP DISCUSS/DSCN MKR DOCD: CPT | Performed by: NURSE PRACTITIONER

## 2025-04-02 RX ORDER — BENZONATATE 200 MG/1
200 CAPSULE ORAL 3 TIMES DAILY PRN
Qty: 21 CAPSULE | Refills: 0 | Status: SHIPPED | OUTPATIENT
Start: 2025-04-02

## 2025-04-02 RX ORDER — CEFDINIR 300 MG/1
300 CAPSULE ORAL 2 TIMES DAILY
Qty: 14 CAPSULE | Refills: 0 | Status: SHIPPED | OUTPATIENT
Start: 2025-04-02 | End: 2025-04-09

## 2025-04-02 ASSESSMENT — ENCOUNTER SYMPTOMS
SINUS PAIN: 0
FEVER: 0
NECK STIFFNESS: 0
PALPITATIONS: 0
EYE DISCHARGE: 0
TROUBLE SWALLOWING: 0
SORE THROAT: 0
CHILLS: 0
ABDOMINAL PAIN: 0
ACTIVITY CHANGE: 0
CHEST TIGHTNESS: 1
HEADACHES: 0
FATIGUE: 1
MYALGIAS: 0
SHORTNESS OF BREATH: 0
COUGH: 1
DIAPHORESIS: 0
VOMITING: 0
WHEEZING: 0
DIARRHEA: 1
NAUSEA: 0
RHINORRHEA: 0
APPETITE CHANGE: 0
ARTHRALGIAS: 0
EYE REDNESS: 0
CONSTIPATION: 0
SINUS PRESSURE: 0

## 2025-04-02 ASSESSMENT — PATIENT HEALTH QUESTIONNAIRE - PHQ9
1. LITTLE INTEREST OR PLEASURE IN DOING THINGS: NOT AT ALL
SUM OF ALL RESPONSES TO PHQ9 QUESTIONS 1 AND 2: 0
2. FEELING DOWN, DEPRESSED OR HOPELESS: NOT AT ALL

## 2025-04-02 NOTE — PROGRESS NOTES
"Subjective   Patient ID: Jessica Woods is a 79 y.o. female who presents for Fatigue, Cough, Sick Visit, Chest Pain, Headache, and Generalized Body Aches.    HPI entered by Medical Assistant and reviewed/updated by myself.    Patient presents in the office today with concerns of cough and fatigue. Patient also states she was having tightness in her chest a few nights ago. Patient states she feels terrible. Ongoing X a week, started to improve yesterday morning but then worsening again by the end of the day. Has tried Coricidin. Patient states she has found a mouse in her car last week, which she subsequently removed and vacuumed, etc. Patient had diarrhea yesterday but has taken imodium. Covid test at home was Negative yesterday.    The patient's allergies, medications, and history were reviewed with them today and updated as indicated.    Review of Systems   Constitutional:  Positive for fatigue. Negative for activity change, appetite change, chills, diaphoresis and fever.   HENT:  Negative for congestion, ear discharge, ear pain, postnasal drip, rhinorrhea, sinus pressure, sinus pain, sneezing, sore throat and trouble swallowing.    Eyes:  Negative for discharge and redness.   Respiratory:  Positive for cough and chest tightness. Negative for shortness of breath and wheezing.    Cardiovascular:  Negative for chest pain, palpitations and leg swelling.   Gastrointestinal:  Positive for diarrhea. Negative for abdominal pain, constipation, nausea and vomiting.   Musculoskeletal:  Negative for arthralgias, myalgias and neck stiffness.   Skin:  Negative for rash.   Neurological:  Negative for headaches.      Objective   Vital Signs: /78 (BP Location: Right arm, Patient Position: Sitting)   Pulse 70   Temp 36.1 °C (97 °F) (Temporal)   Ht 1.499 m (4' 11\")   Wt 73.1 kg (161 lb 3.2 oz)   LMP  (LMP Unknown)   SpO2 98%   BMI 32.56 kg/m²     Physical Exam  Vitals and nursing note reviewed.   Constitutional:     "   General: She is not in acute distress.     Appearance: Normal appearance. She is not ill-appearing.   HENT:      Head: Normocephalic and atraumatic.      Right Ear: Ear canal and external ear normal. No drainage or swelling. A middle ear effusion is present. No mastoid tenderness. Tympanic membrane is not erythematous, retracted or bulging.      Left Ear: Tympanic membrane, ear canal and external ear normal. No drainage or swelling.  No middle ear effusion. No mastoid tenderness. Tympanic membrane is not erythematous, retracted or bulging.      Nose: No congestion or rhinorrhea.      Right Sinus: Maxillary sinus tenderness present. No frontal sinus tenderness.      Left Sinus: Maxillary sinus tenderness present. No frontal sinus tenderness.      Mouth/Throat:      Mouth: Mucous membranes are moist.      Tongue: No lesions.      Palate: No mass and lesions.      Pharynx: Uvula midline. No oropharyngeal exudate, posterior oropharyngeal erythema, uvula swelling or postnasal drip.      Tonsils: No tonsillar exudate or tonsillar abscesses.   Eyes:      General:         Right eye: No discharge.         Left eye: No discharge.      Extraocular Movements: Extraocular movements intact.      Conjunctiva/sclera: Conjunctivae normal.      Pupils: Pupils are equal, round, and reactive to light.   Cardiovascular:      Rate and Rhythm: Normal rate and regular rhythm.      Heart sounds: No murmur heard.  Pulmonary:      Effort: Pulmonary effort is normal. No respiratory distress.      Breath sounds: Normal breath sounds and air entry.   Musculoskeletal:      Right lower leg: No edema.      Left lower leg: No edema.   Lymphadenopathy:      Cervical: No cervical adenopathy.   Skin:     General: Skin is warm and dry.      Coloration: Skin is not jaundiced.      Findings: No erythema or rash.   Neurological:      General: No focal deficit present.      Mental Status: She is alert. Mental status is at baseline.   Psychiatric:          "Mood and Affect: Mood normal.         Behavior: Behavior normal.         Thought Content: Thought content normal.     POCT today: No results found for this visit on 04/02/25.     Assessment & Plan  1. Fatigue, unspecified type        2. Cough, unspecified type        3. Nonintractable headache, unspecified chronicity pattern, unspecified headache type        4. Tightness in chest        5. BMI 32.0-32.9,adult        6. Class 1 obesity with serious comorbidity and body mass index (BMI) of 32.0 to 32.9 in adult, unspecified obesity type        Reviewed/discussed treatment options and health maintenance. Take medications as prescribed. We will contact you with the results of any ordered testing; please send a [employer] message or call the office if you do not hear from us. Follow up in the office {Follow up:23613::\"and as needed.\"} Return sooner if symptoms do not resolve as expected.     Litzy Fontaine, APRN-CNP, DNP  Family Nurse Practitioner  [facility]   "

## 2025-04-12 PROBLEM — E61.1 LOW IRON: Status: RESOLVED | Noted: 2023-05-09 | Resolved: 2025-04-12

## 2025-05-08 DIAGNOSIS — G62.9 NEUROPATHY: ICD-10-CM

## 2025-05-08 RX ORDER — GABAPENTIN 300 MG/1
300 CAPSULE ORAL DAILY
Qty: 90 CAPSULE | Refills: 0 | Status: SHIPPED | OUTPATIENT
Start: 2025-05-08

## 2025-05-22 DIAGNOSIS — G62.9 NEUROPATHY: ICD-10-CM

## 2025-05-22 DIAGNOSIS — E78.2 MIXED HYPERLIPIDEMIA: ICD-10-CM

## 2025-05-22 RX ORDER — ATORVASTATIN CALCIUM 10 MG/1
10 TABLET, FILM COATED ORAL DAILY
Qty: 90 TABLET | Refills: 1 | Status: SHIPPED | OUTPATIENT
Start: 2025-05-22

## 2025-05-22 RX ORDER — CELECOXIB 200 MG/1
200 CAPSULE ORAL DAILY
Qty: 90 CAPSULE | Refills: 1 | Status: SHIPPED | OUTPATIENT
Start: 2025-05-22

## 2025-06-09 ENCOUNTER — OFFICE VISIT (OUTPATIENT)
Dept: PRIMARY CARE | Facility: CLINIC | Age: 80
End: 2025-06-09
Payer: MEDICARE

## 2025-06-09 VITALS
DIASTOLIC BLOOD PRESSURE: 83 MMHG | BODY MASS INDEX: 32.25 KG/M2 | HEART RATE: 62 BPM | HEIGHT: 59 IN | WEIGHT: 160 LBS | RESPIRATION RATE: 16 BRPM | TEMPERATURE: 98 F | SYSTOLIC BLOOD PRESSURE: 138 MMHG | OXYGEN SATURATION: 98 %

## 2025-06-09 DIAGNOSIS — E66.811 CLASS 1 OBESITY WITH BODY MASS INDEX (BMI) OF 32.0 TO 32.9 IN ADULT, UNSPECIFIED OBESITY TYPE, UNSPECIFIED WHETHER SERIOUS COMORBIDITY PRESENT: ICD-10-CM

## 2025-06-09 DIAGNOSIS — N39.0 ACUTE UTI: Primary | ICD-10-CM

## 2025-06-09 DIAGNOSIS — R30.0 DYSURIA: ICD-10-CM

## 2025-06-09 LAB
POC APPEARANCE, URINE: ABNORMAL
POC BILIRUBIN, URINE: NEGATIVE
POC BLOOD, URINE: ABNORMAL
POC COLOR, URINE: YELLOW
POC GLUCOSE, URINE: NEGATIVE MG/DL
POC KETONES, URINE: NEGATIVE MG/DL
POC LEUKOCYTES, URINE: ABNORMAL
POC NITRITE,URINE: POSITIVE
POC PH, URINE: 5.5 PH
POC PROTEIN, URINE: ABNORMAL MG/DL
POC SPECIFIC GRAVITY, URINE: 1.02
POC UROBILINOGEN, URINE: 0.2 EU/DL

## 2025-06-09 PROCEDURE — 99213 OFFICE O/P EST LOW 20 MIN: CPT | Performed by: NURSE PRACTITIONER

## 2025-06-09 PROCEDURE — 81003 URINALYSIS AUTO W/O SCOPE: CPT | Performed by: NURSE PRACTITIONER

## 2025-06-09 RX ORDER — NITROFURANTOIN 25; 75 MG/1; MG/1
100 CAPSULE ORAL 2 TIMES DAILY
Qty: 14 CAPSULE | Refills: 0 | Status: SHIPPED | OUTPATIENT
Start: 2025-06-09 | End: 2025-06-16

## 2025-06-09 ASSESSMENT — ENCOUNTER SYMPTOMS
FLANK PAIN: 0
CONSTIPATION: 0
ABDOMINAL PAIN: 0
SHORTNESS OF BREATH: 0
CHILLS: 0
DIARRHEA: 0
VOMITING: 0
PALPITATIONS: 0
NAUSEA: 0
BACK PAIN: 0
COUGH: 0
DYSURIA: 1
FREQUENCY: 1
FEVER: 0

## 2025-06-09 NOTE — PROGRESS NOTES
"Subjective   Patient ID: Jessica Woods is a 79 y.o. female who presents for UTI.    Symptoms: urgency frequency and burning  Length of symptoms: yesterday  OTC: none  Related information:    UTI   This is a new problem. The current episode started yesterday. The problem occurs every urination. The problem has been unchanged. The quality of the pain is described as burning. There has been no fever. Associated symptoms include frequency and urgency. Pertinent negatives include no chills, flank pain, nausea or vomiting. She has tried increased fluids for the symptoms. The treatment provided mild relief.        Review of Systems   Constitutional:  Negative for chills and fever.   Respiratory:  Negative for cough and shortness of breath.    Cardiovascular:  Negative for chest pain and palpitations.   Gastrointestinal:  Negative for abdominal pain, constipation, diarrhea, nausea and vomiting.   Genitourinary:  Positive for dysuria, frequency and urgency. Negative for flank pain.   Musculoskeletal:  Negative for back pain.       Objective   /83 Comment: auto  Pulse 62   Temp 36.7 °C (98 °F)   Resp 16   Ht 1.499 m (4' 11\")   Wt 72.6 kg (160 lb)   LMP  (LMP Unknown)   SpO2 98%   BMI 32.32 kg/m²     Physical Exam  Vitals and nursing note reviewed.   Constitutional:       General: She is not in acute distress.     Appearance: Normal appearance.   Cardiovascular:      Rate and Rhythm: Normal rate and regular rhythm.      Heart sounds: Normal heart sounds.   Pulmonary:      Effort: Pulmonary effort is normal.      Breath sounds: Normal breath sounds. No wheezing, rhonchi or rales.   Abdominal:      General: Bowel sounds are normal.      Palpations: Abdomen is soft.      Tenderness: There is no abdominal tenderness. There is no right CVA tenderness or left CVA tenderness.   Musculoskeletal:      Cervical back: Neck supple.      Right lower leg: No edema.      Left lower leg: No edema.   Skin:     General: Skin " is warm and dry.   Neurological:      Mental Status: She is alert.   Psychiatric:         Mood and Affect: Mood normal.         Behavior: Behavior normal.       Assessment/Plan   Problem List Items Addressed This Visit    None  Visit Diagnoses         Codes      Acute UTI    -  Primary N39.0    Relevant Medications    nitrofurantoin, macrocrystal-monohydrate, (Macrobid) 100 mg capsule    Other Relevant Orders    POCT UA Automated manually resulted    Urine Culture      Dysuria     R30.0      Class 1 obesity with body mass index (BMI) of 32.0 to 32.9 in adult, unspecified obesity type, unspecified whether serious comorbidity present     E66.811, Z68.32        UTI symptoms: UA positive for leukocytes, blood and nit. . Start Macrobid as directed, urine culture pending. Increase rest and fluids. Follow up with PCP in 3-5 days for recheck, or sooner with any additional concerns. If developing any new/worsening symptoms, proceed to ER

## 2025-06-09 NOTE — PATIENT INSTRUCTIONS
Today you were seen in the [facility] for Urinary symptoms.   A urine culture will be sent and you will be notified of any changes to your current therapy.   A prescription for Macrobid was sent to your pharmacy. Please take this medication as prescribed and until completion.   Increase your daily consumption of water. Avoid caffeine, alcohol and citrus as they can be irritating to the urinary tract. May also add cranberry juice to daily regimen (sugar free)  Practice good hygiene (wiping front to back). Void more frequently throughout the day.   Follow up with your Primary Care Physician within 5 days or as needed  If any new or worsening symptoms, please proceed to emergency department for further evaluation.    no

## 2025-06-12 LAB — BACTERIA UR CULT: ABNORMAL

## 2025-08-18 DIAGNOSIS — G62.9 NEUROPATHY: ICD-10-CM

## 2025-08-18 RX ORDER — GABAPENTIN 300 MG/1
300 CAPSULE ORAL DAILY
Qty: 90 CAPSULE | Refills: 1 | Status: SHIPPED | OUTPATIENT
Start: 2025-08-18

## 2025-08-20 ENCOUNTER — APPOINTMENT (OUTPATIENT)
Dept: PRIMARY CARE | Facility: CLINIC | Age: 80
End: 2025-08-20
Payer: MEDICARE

## 2025-08-20 VITALS
OXYGEN SATURATION: 95 % | BODY MASS INDEX: 31.85 KG/M2 | HEART RATE: 64 BPM | HEIGHT: 59 IN | SYSTOLIC BLOOD PRESSURE: 112 MMHG | DIASTOLIC BLOOD PRESSURE: 66 MMHG | WEIGHT: 158 LBS

## 2025-08-20 DIAGNOSIS — C64.2 RENAL CELL CARCINOMA OF LEFT KIDNEY: ICD-10-CM

## 2025-08-20 DIAGNOSIS — I10 HYPERTENSION, UNSPECIFIED TYPE: ICD-10-CM

## 2025-08-20 DIAGNOSIS — D50.9 IRON DEFICIENCY ANEMIA, UNSPECIFIED IRON DEFICIENCY ANEMIA TYPE: ICD-10-CM

## 2025-08-20 DIAGNOSIS — C64.2 CLEAR CELL CARCINOMA OF LEFT KIDNEY: ICD-10-CM

## 2025-08-20 DIAGNOSIS — N18.32 CHRONIC KIDNEY DISEASE, STAGE 3B (MULTI): ICD-10-CM

## 2025-08-20 DIAGNOSIS — M81.0 AGE-RELATED OSTEOPOROSIS WITHOUT CURRENT PATHOLOGICAL FRACTURE: Primary | ICD-10-CM

## 2025-08-20 LAB
ALBUMIN SERPL-MCNC: 4.3 G/DL (ref 3.6–5.1)
ALP SERPL-CCNC: 73 U/L (ref 37–153)
ALT SERPL-CCNC: 15 U/L (ref 6–29)
ANION GAP SERPL CALCULATED.4IONS-SCNC: 6 MMOL/L (CALC) (ref 7–17)
AST SERPL-CCNC: 23 U/L (ref 10–35)
BILIRUB SERPL-MCNC: 1.4 MG/DL (ref 0.2–1.2)
BUN SERPL-MCNC: 20 MG/DL (ref 7–25)
CALCIUM SERPL-MCNC: 9.6 MG/DL (ref 8.6–10.4)
CHLORIDE SERPL-SCNC: 107 MMOL/L (ref 98–110)
CO2 SERPL-SCNC: 29 MMOL/L (ref 20–32)
CREAT SERPL-MCNC: 1.07 MG/DL (ref 0.6–1)
EGFRCR SERPLBLD CKD-EPI 2021: 53 ML/MIN/1.73M2
GLUCOSE SERPL-MCNC: 92 MG/DL (ref 65–139)
POTASSIUM SERPL-SCNC: 4 MMOL/L (ref 3.5–5.3)
PROT SERPL-MCNC: 6.3 G/DL (ref 6.1–8.1)
SODIUM SERPL-SCNC: 142 MMOL/L (ref 135–146)

## 2025-08-20 PROCEDURE — 99213 OFFICE O/P EST LOW 20 MIN: CPT | Performed by: FAMILY MEDICINE

## 2025-08-20 PROCEDURE — 1159F MED LIST DOCD IN RCRD: CPT | Performed by: FAMILY MEDICINE

## 2025-08-20 PROCEDURE — 3074F SYST BP LT 130 MM HG: CPT | Performed by: FAMILY MEDICINE

## 2025-08-20 PROCEDURE — 1036F TOBACCO NON-USER: CPT | Performed by: FAMILY MEDICINE

## 2025-08-20 PROCEDURE — 3078F DIAST BP <80 MM HG: CPT | Performed by: FAMILY MEDICINE

## 2025-08-20 RX ORDER — IRON POLYSACCHARIDE COMPLEX 150 MG
150 CAPSULE ORAL 2 TIMES DAILY
Qty: 180 CAPSULE | Refills: 1 | Status: SHIPPED | OUTPATIENT
Start: 2025-08-20

## 2025-08-20 RX ORDER — ALENDRONATE SODIUM 70 MG/1
70 TABLET ORAL
Qty: 12 TABLET | Refills: 3 | Status: SHIPPED | OUTPATIENT
Start: 2025-08-20 | End: 2025-11-18

## 2026-02-25 ENCOUNTER — APPOINTMENT (OUTPATIENT)
Dept: PRIMARY CARE | Facility: CLINIC | Age: 81
End: 2026-02-25
Payer: MEDICARE